# Patient Record
Sex: MALE | Race: WHITE | NOT HISPANIC OR LATINO
[De-identification: names, ages, dates, MRNs, and addresses within clinical notes are randomized per-mention and may not be internally consistent; named-entity substitution may affect disease eponyms.]

---

## 2019-12-16 ENCOUNTER — APPOINTMENT (OUTPATIENT)
Dept: POPULATION HEALTH | Facility: CLINIC | Age: 44
End: 2019-12-16
Payer: MEDICARE

## 2019-12-16 VITALS
WEIGHT: 244 LBS | DIASTOLIC BLOOD PRESSURE: 80 MMHG | BODY MASS INDEX: 36.14 KG/M2 | SYSTOLIC BLOOD PRESSURE: 110 MMHG | HEIGHT: 69 IN | HEART RATE: 103 BPM | TEMPERATURE: 98.7 F | OXYGEN SATURATION: 97 % | RESPIRATION RATE: 16 BRPM

## 2019-12-16 DIAGNOSIS — T78.40XA ALLERGY, UNSPECIFIED, INITIAL ENCOUNTER: ICD-10-CM

## 2019-12-16 DIAGNOSIS — R42 DIZZINESS AND GIDDINESS: ICD-10-CM

## 2019-12-16 DIAGNOSIS — R06.02 SHORTNESS OF BREATH: ICD-10-CM

## 2019-12-16 DIAGNOSIS — Z80.9 FAMILY HISTORY OF MALIGNANT NEOPLASM, UNSPECIFIED: ICD-10-CM

## 2019-12-16 PROCEDURE — 99203 OFFICE O/P NEW LOW 30 MIN: CPT

## 2019-12-16 RX ORDER — OMEPRAZOLE 40 MG/1
40 CAPSULE, DELAYED RELEASE ORAL
Refills: 0 | Status: ACTIVE | COMMUNITY
Start: 2019-12-16

## 2019-12-16 NOTE — HISTORY OF PRESENT ILLNESS
[FreeTextEntry1] : s. patient developed a severe reaction the friday before this past friday (a little over a week ago). his symptoms started at work. \par patient describes that he was returning from lung, he had food that he has eaten before and to which never developed any reaction. 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 the metal chain was covered in some type of oily residue, which he never had noticed before. he had the need to scratch his face and right after that he developed acute swelling of both eyes, swelling behind his ears and difficulty breathing. he went to his car to use some wipes to clean his face. his symptoms worsened and by the time he got home, within some 20 min. he had to call the ambulance and was taken to the er where he was given an epipen and kept in emergency care.\par the swelling subsided some 4 days after but has left symptoms of ringing in his ears, dizziness, walking like dragging his feet, and chest tightness. overall he is getting better but his symptoms have not fully improved. he had some wheezing.\par he was treated with prednisone but developed some ill feeling so has not taken this for the past couple of days. he was given some type of allergic medication by his ent but cannot recall the name, is not taking it. he generally uses benadryl for his allergies as the other antihistamines don't help. \par he has not been back to work.\par patient is seeing an ent and an allergist, his pcp, is to get a mri of his head. \par patient works as an .  \par patient has been working at this job for a couple of months, as a part time. he is on disability due to an accident in his left shoulder a couple of years ago, elbow surgery and both hands surgery. 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 he has been an  all his working life but never encountered this type of chain. \par he is currently on disability due to the accident. \par patient has a history of allergies to dander and dust, but never had such a svere allergic reaction.

## 2019-12-16 NOTE — ASSESSMENT
[FreeTextEntry1] : patient brings a picture of the acute reaction, there is a severe edema of both eyelids totally occluding the eye opening. \par a. acute allergic reaction at work. persistent dizziness and some generalized symptoms.\par p. pt is under care of an ent. recommended to continue ent care. patient to get a mri as recommended. i recommended to totally avoid exposure to the chain that he believed caused his reaction. patient to try to get as much info of this chain as possible, will research what possible chemicals are used to cover such metallic chains in an effort to identify the origin of his reaction. i recommended to use an antihistamine medication such as zyrtec or similar on an ongoing basis for a few days to a week, see if this helps. will review medical records from other doctors as well. patient brings a sample of dust and the chain that he reports as the most probable cause of his allergy, will try to find out if any testing can be done. will see him in a couple of weeks.

## 2019-12-16 NOTE — PAST MEDICAL HISTORY
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 last worked regularly some 2 years ago when he sustain an accident on the job injuring his hands, shoulders and elbows.

## 2019-12-16 NOTE — PHYSICAL EXAM
[General Appearance - In No Acute Distress] : in no acute distress [General Appearance - Alert] : alert [General Appearance - Well Nourished] : well nourished [General Appearance - Well Developed] : well developed [Sclera] : the sclera and conjunctiva were normal [PERRL With Normal Accommodation] : pupils were equal in size, round, and reactive to light [Extraocular Movements] : extraocular movements were intact [Neck Appearance] : the appearance of the neck was normal [Outer Ear] : the ears and nose were normal in appearance [Neck Cervical Mass (___cm)] : no neck mass was observed [Jugular Venous Distention Increased] : there was no jugular-venous distention [Thyroid Diffuse Enlargement] : the thyroid was not enlarged [Respiration, Rhythm And Depth] : normal respiratory rhythm and effort [Exaggerated Use Of Accessory Muscles For Inspiration] : no accessory muscle use [] : no respiratory distress [Chest Palpation] : palpation of the chest revealed no abnormalities [Auscultation Breath Sounds / Voice Sounds] : lungs were clear to auscultation bilaterally [Apical Impulse] : the apical impulse was normal [Heart Rate And Rhythm] : heart rate was normal and rhythm regular [Heart Sounds Gallop] : no gallops [Heart Sounds] : normal S1 and S2 [Murmurs] : no murmurs [Cervical Lymph Nodes Enlarged Posterior Bilaterally] : posterior cervical [Cervical Lymph Nodes Enlarged Anterior Bilaterally] : anterior cervical [Edema] : there was no peripheral edema [Supraclavicular Lymph Nodes Enlarged Bilaterally] : supraclavicular [Axillary Lymph Nodes Enlarged Bilaterally] : axillary [Sensation] : the sensory exam was normal to light touch and pinprick [Cranial Nerves] : cranial nerves 2-12 were intact [Deep Tendon Reflexes (DTR)] : deep tendon reflexes were 2+ and symmetric [Motor Exam] : the motor exam was normal [FreeTextEntry1] : romberg was positive.

## 2020-01-27 ENCOUNTER — APPOINTMENT (OUTPATIENT)
Dept: POPULATION HEALTH | Facility: CLINIC | Age: 45
End: 2020-01-27

## 2020-02-18 ENCOUNTER — APPOINTMENT (OUTPATIENT)
Dept: POPULATION HEALTH | Facility: CLINIC | Age: 45
End: 2020-02-18

## 2020-02-25 ENCOUNTER — APPOINTMENT (OUTPATIENT)
Dept: POPULATION HEALTH | Facility: CLINIC | Age: 45
End: 2020-02-25

## 2022-05-05 ENCOUNTER — RESULT CHARGE (OUTPATIENT)
Age: 47
End: 2022-05-05

## 2022-05-05 ENCOUNTER — APPOINTMENT (OUTPATIENT)
Dept: ORTHOPEDIC SURGERY | Facility: CLINIC | Age: 47
End: 2022-05-05
Payer: MEDICARE

## 2022-05-05 VITALS — BODY MASS INDEX: 36.29 KG/M2 | HEIGHT: 69 IN | WEIGHT: 245 LBS

## 2022-05-05 DIAGNOSIS — Z78.9 OTHER SPECIFIED HEALTH STATUS: ICD-10-CM

## 2022-05-05 DIAGNOSIS — M25.512 PAIN IN LEFT SHOULDER: ICD-10-CM

## 2022-05-05 DIAGNOSIS — Z86.39 PERSONAL HISTORY OF OTHER ENDOCRINE, NUTRITIONAL AND METABOLIC DISEASE: ICD-10-CM

## 2022-05-05 PROCEDURE — 99214 OFFICE O/P EST MOD 30 MIN: CPT

## 2022-05-05 PROCEDURE — 72040 X-RAY EXAM NECK SPINE 2-3 VW: CPT

## 2022-05-05 PROCEDURE — 73010 X-RAY EXAM OF SHOULDER BLADE: CPT | Mod: LT

## 2022-05-06 NOTE — IMAGING
[Left] : left shoulder [There are no fractures, subluxations or dislocations. No significant abnormalities are seen] : There are no fractures, subluxations or dislocations. No significant abnormalities are seen [Straightening consistent with spasm] : Straightening consistent with spasm [de-identified] : The patient is a well appearing 46 year old M of their stated age.\par Neck is supple & nontender to palpation. Negative Spurling's test.\par \par Effected Shoulder:  \par Inspection:\par Scapula Winging: Negative\par Deformity: None\par Erythema: None\par Ecchymosis: None\par Abrasions: None\par Effusion: None\par \par Range of Motion:\par Active Forward Flexion: 95 degrees \par Active Abduction: 95 degrees \par Passive Forward Flexion: 180 degrees \par Passive Abduction: 180 degrees \par ER @ 90 degrees: 90 degrees\par IR @ 90 degrees: 45 degrees\par ER @ 0 degrees: 50 degrees\par \par Motor Exam:\par Forward Flexion: 5 out of 5\par Flexion Plane of Scapula: 5 out of 5\par Abduction: 5 out of 5\par Internal Rotation: 5 out of 5\par External Rotation: 5 out of 5\par Distal Motor Strength: 5 out of 5\par Stability Testing:\par Anterior: 1+\par Posterior: 1+\par Sulcus N: 1+\par Sulcus ER: 1+\par \par Provocative Tests:\par Drop Arm: Negative\par Impingement: Negative\par [place]: Negative\par X-Arm Adduction: Negative\par Belly Press: Negative\par Bear Hug: Negative\par Lift Off: Negative\par Apprehension: Negative\par Relocation: Negative\par Posterior Load & Shift: Negative\par Palpation:\par AC Joint: Nontender\par Clavicle: Nontender\par SC Joint: Nontender\par Bicepital Groove: Nontender\par Coracoid Process: Nontender\par Pectoralis Minor Tendon: Nontender\par Pectoralis Major Tendon: Nontender & palpably intact\par Latissimus Dorsi: Nontender \par Proximal Humerus: Nontender\par Scapula Body: Nontender\par Medial Scapula Boarder: tender\par Scapula Spine: Nontender\par \par Neurologic Exam: Sensation to Light Touch:\par Axillary: Grossly intact\par Ulnar: Grossly intact\par Radial: Grossly intact\par Median: Grossly intact\par Other:  N/A\par Circulatory/Pulses:\par Ulnar: 2+\par Radial: 2+\par Other Pertinent Findings: None\par \par Contralateral Shoulder\par Range of Motion:\par Active Forward Flexion: 180 degrees \par Active Abduction: 180 degrees \par Passive Forward Flexion: 180 degrees \par Passive Abduction: 180 degrees \par ER @ 90 degrees: 90 degrees\par IR @ 90 degrees: 45 degrees\par ER @ 0 degrees: 50 degrees\par Motor Exam:\par Forward Flexion: 5 out of 5\par Flexion Plane of Scapula: 5 out of 5\par Abduction: 5 out of 5\par Internal Rotation: 5 out of 5\par External Rotation: 5 out of 5\par Distal Motor Strength: 5 out of 5\par Stability Testing:\par Anterior: 1+\par Posterior: 1+\par Sulcus N: 1+\par Sulcus ER: 1+\par Other Pertinent Findings: None\par \par \par Assessment: The patient is a 46 year old M with left shoulder pain and radiographic and physical exam findings consistent with suspected cervical disc herniation\par   \par The patient’s condition is acute\par Documents/Results Reviewed Today: Xray left shoulder scapular, Xray cervical spine\par Tests/Studies Independently Interpreted Today: Xray left shoulder scapula: normal -- Xray cervical spine: straightening of lordotic curve, spurring of C5 C6 C7 off vertebral body, \par Pertinent findings include: Cervical spine: pain with axial loading, pain at extremes of flexion and extension, +spurlings left, trapezium and paracervical spasms -- Left shoulder: 95/95, tender midscapular border\par Confounding medical conditions/concerns: none\par \par Plan: Patient will obtain MRI of their cervical spine. \par Tests Ordered: MRI cervical spine to eval for disc herniation\par Prescription Medications Ordered: MDP\par Braces/DME Ordered: none\par Activity/Work/Sports Status: part time work\par Additional Instructions: none\par Follow-Up: after MRI\par \par The patient's current medication management of their orthopedic diagnosis was reviewed today:\par (1) We discussed a comprehensive treatment plan that included possible pharmaceutical management involving the use of prescription strength medications including but not limited to options such as oral Naprosyn 500mg BID, once daily Meloxicam 15 mg, or 500-650 mg Tylenol versus over the counter oral medications and topical prescription NSAID Pennsaid vs over the counter Voltaren gel.\par (2) There is a moderate risk of morbidity with further treatment, especially from use of prescription strength medications and possible side effects of these medications which include upset stomach with oral medications, skin reactions to topical medications and cardiac/renal issues with long term use.\par (3) I recommended that the patient follow-up with their medical physician to discuss any significant specific potential issues with long term medication use such as interactions with current medications or with exacerbation of underlying medical comorbidities.\par (4) The benefits and risks associated with use of injectable, oral or topical, prescription and over the counter anti-inflammatory medications were discussed with the patient. The patient voiced understanding of the risks including but not limited to bleeding, stroke, kidney dysfunction, heart disease, and were referred to the black box warning label for further information.\par \par I, Gaetano Fisher, attest that this documentation has been prepared under the direction and in the presence of Provider Dr. Tobin\par \par The documentation recorded by the scribe accurately reflects the service I personally performed and the decisions made by me.\par The patient was seen by me under the direct supervision of Dr. Micah Tobin\par \par \par  [FreeTextEntry1] : normal [FreeTextEntry5] : normal

## 2022-05-06 NOTE — HISTORY OF PRESENT ILLNESS
[de-identified] : The patient is a 46 year old right hand dominant male  who presents today complaining of left shoulder.  \par Date of Injury/Onset: 5/1/22\par Pain:    At Rest: 6/10 \par With Activity:  8/10 \par Mechanism of injury: Patient was taking out the garbage when he went to support it and he felt a pop in his scapula\par This is [not] a Work Related Injury being treated under Worker's Compensation.\par This is [not] an athletic injury occurring associated with an interscholastic or organized sports team.\par Quality of symptoms: Constant ache, sharp pain w/ movement\par Improves with: rest\par Worse with: lying on his back, raising the arm. \par Prior treatment: ER on Monday; received xr and oxy and methocarbamol\par Prior Imaging: XR; didn't bring a CD\par Reports Available For Review Today: None\par Out of work/sport: [No], since [date of injury]\par School/Sport/Position/Occupation: part-time work\par Additional Information: [None]\par \par

## 2022-05-06 NOTE — PHYSICAL EXAM
[] : diminished ROM in all planes [Flexion] : flexion [Extension] : extension [Rotation to left] : rotation to left [Rotation to right] : rotation to right [FreeTextEntry8] : pain with axial loading

## 2022-05-13 ENCOUNTER — RESULT REVIEW (OUTPATIENT)
Age: 47
End: 2022-05-13

## 2022-05-16 ENCOUNTER — APPOINTMENT (OUTPATIENT)
Dept: ORTHOPEDIC SURGERY | Facility: CLINIC | Age: 47
End: 2022-05-16
Payer: MEDICARE

## 2022-05-16 VITALS — BODY MASS INDEX: 36.29 KG/M2 | HEIGHT: 69 IN | WEIGHT: 245 LBS

## 2022-05-16 PROCEDURE — 99215 OFFICE O/P EST HI 40 MIN: CPT

## 2022-05-16 NOTE — IMAGING
[de-identified] : The patient is a well appearing 46 year old M of their stated age.\par Neck is supple & nontender to palpation. Negative Spurling's test.\par \par Effected Shoulder:  \par Inspection:\par Scapula Winging: Negative\par Deformity: None\par Erythema: None\par Ecchymosis: None\par Abrasions: None\par Effusion: None\par \par Range of Motion:\par Active Forward Flexion: 95 degrees \par Active Abduction: 95 degrees \par Passive Forward Flexion: 180 degrees \par Passive Abduction: 180 degrees \par ER @ 90 degrees: 90 degrees\par IR @ 90 degrees: 45 degrees\par ER @ 0 degrees: 50 degrees\par \par Motor Exam:\par Forward Flexion: 5 out of 5\par Flexion Plane of Scapula: 5 out of 5\par Abduction: 5 out of 5\par Internal Rotation: 5 out of 5\par External Rotation: 5 out of 5\par Distal Motor Strength: 5 out of 5\par Stability Testing:\par Anterior: 1+\par Posterior: 1+\par Sulcus N: 1+\par Sulcus ER: 1+\par \par Provocative Tests:\par Drop Arm: Negative\par Impingement: Negative\par [place]: Negative\par X-Arm Adduction: Negative\par Belly Press: Negative\par Bear Hug: Negative\par Lift Off: Negative\par Apprehension: Negative\par Relocation: Negative\par Posterior Load & Shift: Negative\par Palpation:\par AC Joint: Nontender\par Clavicle: Nontender\par SC Joint: Nontender\par Bicepital Groove: Nontender\par Coracoid Process: Nontender\par Pectoralis Minor Tendon: Nontender\par Pectoralis Major Tendon: Nontender & palpably intact\par Latissimus Dorsi: Nontender \par Proximal Humerus: Nontender\par Scapula Body: Nontender\par Medial Scapula Boarder: tender\par Scapula Spine: Nontender\par \par Neurologic Exam: Sensation to Light Touch:\par Axillary: Grossly intact\par Ulnar: Grossly intact\par Radial: Grossly intact\par Median: Grossly intact\par Other:  N/A\par Circulatory/Pulses:\par Ulnar: 2+\par Radial: 2+\par Other Pertinent Findings: None\par \par Contralateral Shoulder\par Range of Motion:\par Active Forward Flexion: 180 degrees \par Active Abduction: 180 degrees \par Passive Forward Flexion: 180 degrees \par Passive Abduction: 180 degrees \par ER @ 90 degrees: 90 degrees\par IR @ 90 degrees: 45 degrees\par ER @ 0 degrees: 50 degrees\par Motor Exam:\par Forward Flexion: 5 out of 5\par Flexion Plane of Scapula: 5 out of 5\par Abduction: 5 out of 5\par Internal Rotation: 5 out of 5\par External Rotation: 5 out of 5\par Distal Motor Strength: 5 out of 5\par Stability Testing:\par Anterior: 1+\par Posterior: 1+\par Sulcus N: 1+\par Sulcus ER: 1+\par Other Pertinent Findings: None\par \par \par Assessment: The patient is a 46 year old M with left shoulder pain and radiographic and physical exam findings consistent with cervical bone lesion\par   \par The patient’s condition is acute\par Documents/Results Reviewed Today: MRI cervical spine\par Tests/Studies Independently Interpreted Today: MRI cervical spine reveals marrow replacing lesions at T1 and T2 as well as anterior superior endplate of C3 with posterior expansion of T1 vertebral body, disc herniation at C3-4, large left lobe of thyroid nodule\par Pertinent findings include: Cervical spine: pain with axial loading, pain at extremes of flexion and extension, +spurlings left, trapezium and paracervical spasms -- Left shoulder: 95/95, tender midscapular border\par Confounding medical conditions/concerns: none\par \par Plan: Order contrast MRI of cervical spine, ordered with and w/out contract thoracic spine MRIs, ordered CT-Scan thoracic. \par Tests Ordered: Contrast Cervical MRI, With and w/out Contrast Thoracic MRI, CT-Scan Thoracic spine to eval for metastatic vs. primary bone lesion\par Prescription Medications Ordered: none\par Braces/DME Ordered: none\par Activity/Work/Sports Status: part time work\par Additional Instructions: none\par Follow-Up: after MRIs and CT-Scan\par \par The patient's current medication management of their orthopedic diagnosis was reviewed today:\par (1) We discussed a comprehensive treatment plan that included possible pharmaceutical management involving the use of prescription strength medications including but not limited to options such as oral Naprosyn 500mg BID, once daily Meloxicam 15 mg, or 500-650 mg Tylenol versus over the counter oral medications and topical prescription NSAID Pennsaid vs over the counter Voltaren gel.\par (2) There is a moderate risk of morbidity with further treatment, especially from use of prescription strength medications and possible side effects of these medications which include upset stomach with oral medications, skin reactions to topical medications and cardiac/renal issues with long term use.\par (3) I recommended that the patient follow-up with their medical physician to discuss any significant specific potential issues with long term medication use such as interactions with current medications or with exacerbation of underlying medical comorbidities.\par (4) The benefits and risks associated with use of injectable, oral or topical, prescription and over the counter anti-inflammatory medications were discussed with the patient. The patient voiced understanding of the risks including but not limited to bleeding, stroke, kidney dysfunction, heart disease, and were referred to the black box warning label for further information.\par \par I, Gaetano Fisher, attest that this documentation has been prepared under the direction and in the presence of Provider Dr. Tobin\par \par The documentation recorded by the scribe accurately reflects the service I personally performed and the decisions made by me.\par \par

## 2022-05-16 NOTE — DATA REVIEWED
[MRI] : MRI [Cervical Spine] : cervical spine [Report was reviewed and noted in the chart] : The report was reviewed and noted in the chart [I independently reviewed and interpreted images and report] : I independently reviewed and interpreted images and report [I reviewed the films/CD and additionally noted] : I reviewed the films/CD and additionally noted [FreeTextEntry1] : marrow replacing lesions at T1 and T2 as well as anterior superior endplate of C3 with posterior expansion of T1 vertebral body, disc herniation at C3-4, large left lobe of thyroid nodule

## 2022-05-16 NOTE — HISTORY OF PRESENT ILLNESS
[de-identified] : The patient is a 46 year old right hand dominant male  who presents today complaining of left shoulder.  \par Date of Injury/Onset: 5/1/22\par Pain:    At Rest: 5/10 \par With Activity:  8/10 \par Mechanism of injury: Patient was taking out the garbage when he went to support it and he felt a pop in his scapula\par This is not a Work Related Injury being treated under Worker's Compensation.\par This is not an athletic injury occurring associated with an interscholastic or organized sports team.\par Quality of symptoms: Constant ache, sharp pain w/ movement\par Improves with: rest\par Worse with: lying on his back, raising the arm. \par Treatment/Imaging/Studies Since Last Visit: MRI\par 	Reports Available For Review Today: MRI report\par Out of work/sport: not working\par School/Sport/Position/Occupation: part-time work\par Additional Information: pt states he thinks he broke a rib while sneezing\par \par

## 2022-05-20 ENCOUNTER — RESULT REVIEW (OUTPATIENT)
Age: 47
End: 2022-05-20

## 2022-05-23 ENCOUNTER — APPOINTMENT (OUTPATIENT)
Dept: ORTHOPEDIC SURGERY | Facility: CLINIC | Age: 47
End: 2022-05-23
Payer: MEDICARE

## 2022-05-23 VITALS — BODY MASS INDEX: 36.29 KG/M2 | HEIGHT: 69 IN | WEIGHT: 245 LBS

## 2022-05-23 DIAGNOSIS — M54.2 CERVICALGIA: ICD-10-CM

## 2022-05-23 PROCEDURE — 99214 OFFICE O/P EST MOD 30 MIN: CPT

## 2022-05-24 NOTE — HISTORY OF PRESENT ILLNESS
[Sudden] : sudden [Dull/Aching] : dull/aching [Sharp] : sharp [Shooting] : shooting [Frequent] : frequent [Leisure] : leisure [Sleep] : sleep [Nothing helps with pain getting better] : Nothing helps with pain getting better [Sitting] : sitting [Not working due to injury] : Work status: not working due to injury [de-identified] : Patient is a 47 y/o male who presents for evaluation of a chief complaint of neck pain. Reports worsening stabbing pain at the base of the neck over several months duration. Notes some burning and numbness and tingling into the left arm and forearm. 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 Symptoms aggravated with neck extension and activity. Denies recent constitutional symptoms. A known h/o thyroid cyst. [] : no [FreeTextEntry1] : T-spine / C-spine  [FreeTextEntry7] : left arm  [de-identified] : Stretching  [de-identified] : T-spine Epidurals [de-identified] : Dr. Bermeo  [de-identified] : MRI @

## 2022-05-24 NOTE — DISCUSSION/SUMMARY
[de-identified] : Discussed radiographic findings with patient as they pertain to his symptoms. Discussed differential diagnosis of metastatic disease with infection a distant second. Discussed workup to include blood work( cbc with diff, ESR, c reactive protein, thyroid function tests, psa, spep/upep, comp chem) Prescriptions given for CT chest, abdomen and pelvis. Referred to interventional radiology for CT guided biopsy to obtain tissue diagnosis.

## 2022-05-24 NOTE — DATA REVIEWED
[CT Scan] : CT scan [MRI] : MRI [Cervical Spine] : cervical spine [Thoracic Spine] : thoracic spine [Report was reviewed and noted in the chart] : The report was reviewed and noted in the chart [I independently reviewed and interpreted images and report] : I independently reviewed and interpreted images and report [I reviewed the films/CD and additionally noted] : I reviewed the films/CD and additionally noted [FreeTextEntry1] : I stop paperwork reviewed

## 2022-05-24 NOTE — PHYSICAL EXAM
[Normal Coordination] : normal coordination [Normal DTR UE/LE] : normal DTR UE/LE  [Normal Sensation] : normal sensation [Normal Mood and Affect] : normal mood and affect [Orientated] : orientated [Able to Communicate] : able to communicate [Normal Skin] : normal skin [No Rash] : no rash [No Ulcers] : no ulcers [No Lesions] : no lesions [No obvious lymphadenopathy in areas examined] : no obvious lymphadenopathy in areas examined [Well Developed] : well developed [Well Nourished] : well nourished [Peripheral vascular exam is grossly normal] : peripheral vascular exam is grossly normal [No Respiratory Distress] : no respiratory distress [Rotation to left] : rotation to left [Extension] : extension [4___] : left finger abductors 4[unfilled]/5 [] : positive Spurling [de-identified] : left finger adductors 4/5

## 2022-06-04 ENCOUNTER — RESULT REVIEW (OUTPATIENT)
Age: 47
End: 2022-06-04

## 2022-06-24 ENCOUNTER — APPOINTMENT (OUTPATIENT)
Dept: ORTHOPEDIC SURGERY | Facility: CLINIC | Age: 47
End: 2022-06-24

## 2022-06-24 VITALS — HEIGHT: 69 IN | WEIGHT: 245 LBS | BODY MASS INDEX: 36.29 KG/M2

## 2022-06-24 DIAGNOSIS — R07.9 CHEST PAIN, UNSPECIFIED: ICD-10-CM

## 2022-06-24 PROCEDURE — 72070 X-RAY EXAM THORAC SPINE 2VWS: CPT

## 2022-06-24 PROCEDURE — 99214 OFFICE O/P EST MOD 30 MIN: CPT

## 2022-06-24 PROCEDURE — 73000 X-RAY EXAM OF COLLAR BONE: CPT | Mod: LT

## 2022-06-24 PROCEDURE — 72040 X-RAY EXAM NECK SPINE 2-3 VW: CPT

## 2022-06-26 NOTE — DATA REVIEWED
[Cervical Spine] : cervical spine [Thoracic Spine] : thoracic spine [I independently reviewed and interpreted images and report] : I independently reviewed and interpreted images and report [I reviewed the films/CD and additionally noted] : I reviewed the films/CD and additionally noted [FreeTextEntry2] : Chest xray taken in office today demonstrates no evidence of rib fracture [FreeTextEntry1] : I stop paperwork reviewed\par Hematology consult notes reviewed\par Biopsy report reviewed

## 2022-06-26 NOTE — DISCUSSION/SUMMARY
[Medication Risks Reviewed] : Medication risks reviewed [de-identified] : Discussed radiographic findings with patient as they pertain to his symptoms. Discussed diagnosis of multiple myeloma with thoracic vertebra involvement and left C8 nerve impingement. Patient will continue follow up with oncologist. Scheduled for directed radiation therapy next week.Repeated x-ray for thoracic spine reveal pathologic lesions in T1-2 with no worsening collapse of the vertebrae. Rib pain likely bone contusion. Will consider surgical intervention if chemo/RT do not improve symptoms.Will follow up in 4 weeks. \par \par \par Prior to appointment and during encounter with patient extensive medical records were reviewed including but not limited to, hospital records, outpatient records, imaging results, and lab data. During this appointment the patient was examined, diagnoses were discussed and explained in a face to face manner. In addition extensive time was spent reviewing aforementioned diagnostic studies. Counseling including abnormal image results, differential diagnoses, treatment options, risk and benefits, lifestyle changes, current condition, and current medications was performed. Patient's comments, questions, and concerns were addressed and patient verbalized understanding. Based on this patient's presentation at our office, which is an orthopedic spine surgeon's office, this patient inherently / intrinsically has a risk, however minute, of developing issues such as Cauda equina syndrome, bowel and bladder changes, or progression of motor or neurological deficits such as paralysis which may be permanent.\par \par The documentation recorded by the scribe, in my presence, accurately reflects the service that I personally performed and the decisions made by me with my edits as appropriate.\par

## 2022-06-26 NOTE — HISTORY OF PRESENT ILLNESS
[9] : 9 [8] : 8 [de-identified] : Patient is a 46 y/o male who presents for evaluation of a chief complaint of neck pain. Reports worsening stabbing pain at the base of the neck over several months duration. Notes some burning and numbness and tingling into the left arm and forearm. 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 Symptoms aggravated with neck extension and activity. A known h/o thyroid cyst. Patient currently undergoing radiation therapy for multiple myeloma. Patient is taking Lyrica and oxycodone. Patient states moderate relief from medications.  [FreeTextEntry1] : T spine

## 2022-06-26 NOTE — PHYSICAL EXAM
[Normal Coordination] : normal coordination [Normal DTR UE/LE] : normal DTR UE/LE  [Normal Sensation] : normal sensation [Normal Mood and Affect] : normal mood and affect [Orientated] : orientated [Able to Communicate] : able to communicate [Normal Skin] : normal skin [No Rash] : no rash [No Ulcers] : no ulcers [No Lesions] : no lesions [No obvious lymphadenopathy in areas examined] : no obvious lymphadenopathy in areas examined [Well Developed] : well developed [Well Nourished] : well nourished [Peripheral vascular exam is grossly normal] : peripheral vascular exam is grossly normal [No Respiratory Distress] : no respiratory distress [Rotation to left] : rotation to left [Extension] : extension [4___] : left finger abductors 4[unfilled]/5 [] : positive Spurling [de-identified] : left finger adductors 4/5 [FreeTextEntry1] : Pathologic lesions in T1-2 with no worsening collapse of the vertebrae.  [FreeTextEntry5] : No fractures

## 2022-08-01 ENCOUNTER — APPOINTMENT (OUTPATIENT)
Dept: ORTHOPEDIC SURGERY | Facility: CLINIC | Age: 47
End: 2022-08-01

## 2022-08-01 VITALS — HEIGHT: 69 IN | BODY MASS INDEX: 37.03 KG/M2 | WEIGHT: 250 LBS

## 2022-08-01 PROCEDURE — 99214 OFFICE O/P EST MOD 30 MIN: CPT

## 2022-08-14 NOTE — DATA REVIEWED
[CT Scan] : CT scan [MRI] : MRI [Report was reviewed and noted in the chart] : The report was reviewed and noted in the chart [Cervical Spine] : cervical spine [I independently reviewed and interpreted images and report] : I independently reviewed and interpreted images and report [I reviewed the films/CD and additionally noted] : I reviewed the films/CD and additionally noted [FreeTextEntry1] : I stop paperwork reviewed\par Oncology progress notes reviewed

## 2022-08-14 NOTE — PHYSICAL EXAM
[Normal Coordination] : normal coordination [Normal DTR UE/LE] : normal DTR UE/LE  [Normal Sensation] : normal sensation [Normal Mood and Affect] : normal mood and affect [Orientated] : orientated [Able to Communicate] : able to communicate [Normal Skin] : normal skin [No Rash] : no rash [No Ulcers] : no ulcers [No Lesions] : no lesions [No obvious lymphadenopathy in areas examined] : no obvious lymphadenopathy in areas examined [Well Developed] : well developed [Well Nourished] : well nourished [Peripheral vascular exam is grossly normal] : peripheral vascular exam is grossly normal [No Respiratory Distress] : no respiratory distress [Rotation to left] : rotation to left [Extension] : extension [4___] : left finger abductors 4[unfilled]/5 [] : positive Spurling [FreeTextEntry8] : Left sided trapezial tenderness worse.  [de-identified] : left finger adductors 4/5 [FreeTextEntry1] : Pathologic lesions in T1-2 with no worsening collapse of the vertebrae.  [FreeTextEntry5] : No fractures

## 2022-08-14 NOTE — HISTORY OF PRESENT ILLNESS
[6] : 6 [10] : 10 [Disabled] : Work status: disabled [de-identified] : Patient is a 48 y/o male who presents for evaluation of a chief complaint of neck pain. Reports worsening stabbing pain at the base of the neck over several months duration. Notes some burning and numbness and tingling into the left arm and forearm. 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 Symptoms aggravated with neck extension and activity. A known h/o thyroid cyst. Patient currently undergoing radiation therapy for multiple myeloma. Patient is taking Lyrica and oxycodone. Patient states moderate relief from medications.  Reports improvement in dexterity. Improvement of symptoms since previous visit. \par C/o pelvis pain, with associated intermittent LT thigh pain.  [de-identified] : pain mgmt

## 2022-08-14 NOTE — DISCUSSION/SUMMARY
[Medication Risks Reviewed] : Medication risks reviewed [de-identified] : Discussed radiographic findings with patient as they pertain to his symptoms. Discussed diagnosis of multiple myeloma with thoracic vertebra involvement and left C8 nerve impingement. Patient will continue follow up with oncologist. Discussed looking out for onset of new neck symptoms. Advised patient to look out for changes in BUE strength. Repeated x-ray for thoracic spine reveal pathologic lesions in T1-2 with no worsening collapse of the vertebrae. Rib pain likely bone contusion. Will consider surgical intervention if chemo/RT do not improve symptoms.Will follow up in 6.\par \par \par Prior to appointment and during encounter with patient extensive medical records were reviewed including but not limited to, hospital records, outpatient records, imaging results, and lab data. During this appointment the patient was examined, diagnoses were discussed and explained in a face to face manner. In addition extensive time was spent reviewing aforementioned diagnostic studies. Counseling including abnormal image results, differential diagnoses, treatment options, risk and benefits, lifestyle changes, current condition, and current medications was performed. Patient's comments, questions, and concerns were addressed and patient verbalized understanding. Based on this patient's presentation at our office, which is an orthopedic spine surgeon's office, this patient inherently / intrinsically has a risk, however minute, of developing issues such as Cauda equina syndrome, bowel and bladder changes, or progression of motor or neurological deficits such as paralysis which may be permanent.\par \par The documentation recorded by the scribe, in my presence, accurately reflects the service that I personally performed and the decisions made by me with my edits as appropriate.\par

## 2022-09-19 ENCOUNTER — APPOINTMENT (OUTPATIENT)
Dept: ORTHOPEDIC SURGERY | Facility: CLINIC | Age: 47
End: 2022-09-19

## 2022-10-05 ENCOUNTER — APPOINTMENT (OUTPATIENT)
Dept: ORTHOPEDIC SURGERY | Facility: CLINIC | Age: 47
End: 2022-10-05

## 2022-10-31 ENCOUNTER — APPOINTMENT (OUTPATIENT)
Dept: ORTHOPEDIC SURGERY | Facility: CLINIC | Age: 47
End: 2022-10-31

## 2022-10-31 VITALS — BODY MASS INDEX: 37.03 KG/M2 | HEIGHT: 69 IN | WEIGHT: 250 LBS

## 2022-10-31 DIAGNOSIS — C80.1 MALIGNANT (PRIMARY) NEOPLASM, UNSPECIFIED: ICD-10-CM

## 2022-10-31 DIAGNOSIS — Z78.9 OTHER SPECIFIED HEALTH STATUS: ICD-10-CM

## 2022-10-31 PROCEDURE — 72040 X-RAY EXAM NECK SPINE 2-3 VW: CPT

## 2022-10-31 PROCEDURE — 99214 OFFICE O/P EST MOD 30 MIN: CPT

## 2022-10-31 NOTE — DISCUSSION/SUMMARY
[Medication Risks Reviewed] : Medication risks reviewed [de-identified] : Discussed diagnosis of multiple myeloma with thoracic vertebra involvement and left C8 nerve impingement. Patient will continue follow up with oncologist. Discussed looking out for onset of new neck symptoms. Advised patient to look out for changes in BUE strength. Repeated x-ray for thoracic spine reveal pathologic lesions in T1-2 with no worsening collapse of the vertebrae. I am requesting a cervical spine MRI with and without contrast, h/o destructive lesion C7-T1 s/p chemo, persistent left upper extremity radiculopathy, will eval for nerve impingement C8. Will consider surgical intervention if chemo/RT do not improve symptoms. Will follow up after MRI. \par \par Prior to appointment and during encounter with patient extensive medical records were reviewed including but not limited to, hospital records, outpatient records, imaging results, and lab data. During this appointment the patient was examined, diagnoses were discussed and explained in a face to face manner. In addition extensive time was spent reviewing aforementioned diagnostic studies. Counseling including abnormal image results, differential diagnoses, treatment options, risk and benefits, lifestyle changes, current condition, and current medications was performed. Patient's comments, questions, and concerns were addressed and patient verbalized understanding. Based on this patient's presentation at our office, which is an orthopedic spine surgeon's office, this patient inherently / intrinsically has a risk, however minute, of developing issues such as Cauda equina syndrome, bowel and bladder changes, or progression of motor or neurological deficits such as paralysis which may be permanent.\par \par MAHNAZ QUEZADA Acting as a Scribe for Dr. Ghotra\alicia BROUSSARD, Mahnaz Quezada, attest that this documentation has been prepared under the direction and in the presence of Provider Darrell Ghotra MD.

## 2022-10-31 NOTE — DATA REVIEWED
[MRI] : MRI [Report was reviewed and noted in the chart] : The report was reviewed and noted in the chart [Cervical Spine] : cervical spine [I independently reviewed and interpreted images and report] : I independently reviewed and interpreted images and report [I reviewed the films/CD and additionally noted] : I reviewed the films/CD and additionally noted [FreeTextEntry2] : In office xrays taken today demonstrate maintained vertebral body heights and alignment C7 and T1 [FreeTextEntry1] : I stop paperwork reviewed

## 2022-10-31 NOTE — PHYSICAL EXAM
[Normal Coordination] : normal coordination [Normal DTR UE/LE] : normal DTR UE/LE  [Normal Sensation] : normal sensation [Normal Mood and Affect] : normal mood and affect [Orientated] : orientated [Able to Communicate] : able to communicate [Normal Skin] : normal skin [No Rash] : no rash [No Ulcers] : no ulcers [No Lesions] : no lesions [No obvious lymphadenopathy in areas examined] : no obvious lymphadenopathy in areas examined [Well Developed] : well developed [Well Nourished] : well nourished [Peripheral vascular exam is grossly normal] : peripheral vascular exam is grossly normal [No Respiratory Distress] : no respiratory distress [Rotation to left] : rotation to left [Extension] : extension [4___] : left finger abductors 4[unfilled]/5 [] : positive Spurling [FreeTextEntry8] : Left sided trapezial tenderness worse.  [de-identified] : left finger adductors 4/5 [FreeTextEntry1] : Pathologic lesions in T1-2 with no worsening collapse of the vertebrae.  [FreeTextEntry5] : No fractures

## 2022-10-31 NOTE — HISTORY OF PRESENT ILLNESS
[3] : 3 [Radiating] : radiating [] : yes [Not working due to injury] : Work status: not working due to injury [de-identified] : 10/31/2022 - 46 y/o male presenting for a follow up eval of cervical. Stiffness present in the neck, as well as pain/numbness in the left upper extremity. Since his last visit, no recent imaging due to current cancer treatment. Pain present in the lt forearm, denies pain in the upper arm. Paresthesias present in the b/l hands, left worse than right. Left upper extremity strength improved as well as ROM in the neck. States oncology specialists are pleased with his current progress from radiation therapy. \par \par 08/01/2022 - Patient is a 46 y/o male who presents for evaluation of a chief complaint of neck pain. Reports worsening stabbing pain at the base of the neck over several months duration. Notes some burning and numbness and tingling into the left arm and forearm. 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 Symptoms aggravated with neck extension and activity. A known h/o thyroid cyst. Patient currently undergoing radiation therapy for multiple myeloma. Patient is taking Lyrica and oxycodone. Patient states moderate relief from medications.  Reports improvement in dexterity. Improvement of symptoms since previous visit. \par C/o pelvis pain, with associated intermittent LT thigh pain. \par \par \par  [FreeTextEntry7] : lt arm/elbow, rt hand [de-identified] : no treatment at this time

## 2022-11-12 ENCOUNTER — APPOINTMENT (OUTPATIENT)
Dept: ORTHOPEDIC SURGERY | Facility: CLINIC | Age: 47
End: 2022-11-12

## 2022-12-13 ENCOUNTER — APPOINTMENT (OUTPATIENT)
Dept: ORTHOPEDIC SURGERY | Facility: CLINIC | Age: 47
End: 2022-12-13

## 2022-12-13 PROCEDURE — 99443: CPT

## 2022-12-15 NOTE — HISTORY OF PRESENT ILLNESS
[de-identified] : 12/13/2022 - 48 y/o male presenting for a tele-visit of cervical. Complains of neck, upper/mid back pain. Radicular pain present in the b/l arms, worse on the left side. Continues to receive stem cell transplant, states he is doing well in regards to his cancer treatment. Using Lyrica 3 x / day, no significant relief. States his symptoms are significant. \par \par 10/31/2022 - 48 y/o male presenting for a follow up eval of cervical. Stiffness present in the neck, as well as pain/numbness in the left upper extremity. Since his last visit, no recent imaging due to current cancer treatment. Pain present in the lt forearm, denies pain in the upper arm. Paresthesias present in the b/l hands, left worse than right. Left upper extremity strength improved as well as ROM in the neck. hospitals oncology specialists are pleased with his current progress from radiation therapy. \par \par 08/01/2022 - Patient is a 48 y/o male who presents for evaluation of a chief complaint of neck pain. Reports worsening stabbing pain at the base of the neck over several months duration. Notes some burning and numbness and tingling into the left arm and forearm. 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 Symptoms aggravated with neck extension and activity. A known h/o thyroid cyst. Patient currently undergoing radiation therapy for multiple myeloma. Patient is taking Lyrica and oxycodone. Patient states moderate relief from medications.  Reports improvement in dexterity. Improvement of symptoms since previous visit. \par C/o pelvis pain, with associated intermittent LT thigh pain. \par \par \par

## 2022-12-15 NOTE — DATA REVIEWED
[MRI] : MRI [Cervical Spine] : cervical spine [Report was reviewed and noted in the chart] : The report was reviewed and noted in the chart [FreeTextEntry1] : I stop paperwork reviewed

## 2022-12-15 NOTE — DISCUSSION/SUMMARY
[Medication Risks Reviewed] : Medication risks reviewed [de-identified] : Discussed diagnosis of multiple myeloma with thoracic vertebra involvement and left C8 nerve impingement. Patient will continue follow up with oncologist. Discussed looking out for onset of new neck symptoms. Advised patient to look out for changes in BUE strength. Advised patient to wear cervical collar in terms of potential relief of his neck pain. Patient will call [place] [facility] and receive images of the report. Will find out timeline from oncology to discuss when it would be safe to move forward with likely longer segment posterior cervicothoracic fusion with decompression.\par \par Todays televisit was initiated bty the patient.Patient was not seen in the past week nor will be seen in the next 24 hours. Visit duration  minutes.. \par \par Prior to appointment and during encounter with patient extensive medical records were reviewed including but not limited to, hospital records, outpatient records, imaging results, and lab data. During this appointment the patient was examined, diagnoses were discussed and explained in a face to face manner. In addition extensive time was spent reviewing aforementioned diagnostic studies. Counseling including abnormal image results, differential diagnoses, treatment options, risk and benefits, lifestyle changes, current condition, and current medications was performed. Patient's comments, questions, and concerns were addressed and patient verbalized understanding. Based on this patient's presentation at our office, which is an orthopedic spine surgeon's office, this patient inherently / intrinsically has a risk, however minute, of developing issues such as Cauda equina syndrome, bowel and bladder changes, or progression of motor or neurological deficits such as paralysis which may be permanent.\par \par MAHNAZ QUEZADA Acting as a Scribe for Dr. Sarah BROUSSARD, Mahnaz Quezada, attest that this documentation has been prepared under the direction and in the presence of Provider Darrell Ghotra MD.

## 2023-01-25 ENCOUNTER — APPOINTMENT (OUTPATIENT)
Dept: ORTHOPEDIC SURGERY | Facility: CLINIC | Age: 48
End: 2023-01-25

## 2023-01-27 ENCOUNTER — APPOINTMENT (OUTPATIENT)
Dept: ORTHOPEDIC SURGERY | Facility: CLINIC | Age: 48
End: 2023-01-27
Payer: MEDICARE

## 2023-01-27 VITALS — WEIGHT: 245 LBS | BODY MASS INDEX: 36.29 KG/M2 | HEIGHT: 69 IN

## 2023-01-27 PROCEDURE — 99214 OFFICE O/P EST MOD 30 MIN: CPT

## 2023-02-06 NOTE — PHYSICAL EXAM
[Normal Coordination] : normal coordination [Normal DTR UE/LE] : normal DTR UE/LE  [Normal Sensation] : normal sensation [Normal Mood and Affect] : normal mood and affect [Orientated] : orientated [Able to Communicate] : able to communicate [Normal Skin] : normal skin [No Rash] : no rash [No Ulcers] : no ulcers [No Lesions] : no lesions [No obvious lymphadenopathy in areas examined] : no obvious lymphadenopathy in areas examined [Well Developed] : well developed [Well Nourished] : well nourished [Peripheral vascular exam is grossly normal] : peripheral vascular exam is grossly normal [No Respiratory Distress] : no respiratory distress [Rotation to left] : rotation to left [Extension] : extension [4___] : left finger abductors 4[unfilled]/5 [] : positive Spurling [FreeTextEntry8] : Left sided trapezial tenderness worse.  [de-identified] : left finger adductors 4/5 [FreeTextEntry1] : Pathologic lesions in T1-2 with no worsening collapse of the vertebrae.  [FreeTextEntry5] : No fractures

## 2023-02-06 NOTE — DISCUSSION/SUMMARY
[Medication Risks Reviewed] : Medication risks reviewed [de-identified] : Discussed diagnosis of multiple myeloma with thoracic vertebra involvement and left C8 nerve impingement. Patient will continue follow up with oncologist as directed. Discussed looking out for onset of new neck symptoms. Patient is indicated for segment posterior cervicothoracic fusion with decompression. Risks, benefits and expected outcomes have been explained to the patient. Patient was understanding and in agreement. Patient will follow up with oncology to discuss when it would be safe to move forward with surgical intervention, the discussed timeline as of right now is 02/2023 if PET Scan returns normal. \par \par Prior to appointment and during encounter with patient extensive medical records were reviewed including but not limited to, hospital records, outpatient records, imaging results, and lab data. During this appointment the patient was examined, diagnoses were discussed and explained in a face to face manner. In addition extensive time was spent reviewing aforementioned diagnostic studies. Counseling including abnormal image results, differential diagnoses, treatment options, risk and benefits, lifestyle changes, current condition, and current medications was performed. Patient's comments, questions, and concerns were addressed and patient verbalized understanding. Based on this patient's presentation at our office, which is an orthopedic spine surgeon's office, this patient inherently / intrinsically has a risk, however minute, of developing issues such as Cauda equina syndrome, bowel and bladder changes, or progression of motor or neurological deficits such as paralysis which may be permanent.\par \par MAHNAZ PATRICIA Acting as a Scribe for Mahnaz Rivas, attest that this documentation has been prepared under the direction and in the presence of Provider Darrell Ghotra MD.

## 2023-02-06 NOTE — HISTORY OF PRESENT ILLNESS
[Neck] : neck [Mid-back] : mid-back [Gradual] : gradual [5] : 5 [Dull/Aching] : dull/aching [Constant] : constant [Nothing helps with pain getting better] : Nothing helps with pain getting better [de-identified] : 01/27/2023 - 48 y/o male presenting for a follow up. Doing well in regards to his cancer treatment, oncology specialists are happy with his progress and are on board for end of 02/2023 as long as PET Scan is normal. Continues to have persistent paresthesia and forearm pain. Strength in the upper extremities is intact with the exception of left abduction in the fingers. Symptoms are functionally incapacitating, ready to move forward with surgery.  \par \par 12/13/2022 - 48 y/o male presenting for a tele-visit of cervical. Complains of neck, upper/mid back pain. Radicular pain present in the b/l arms, worse on the left side. Continues to receive stem cell transplant, states he is doing well in regards to his cancer treatment. Using Lyrica 3 x / day, no significant relief. States his symptoms are significant. \par \par 10/31/2022 - 48 y/o male presenting for a follow up eval of cervical. Stiffness present in the neck, as well as pain/numbness in the left upper extremity. Since his last visit, no recent imaging due to current cancer treatment. Pain present in the lt forearm, denies pain in the upper arm. Paresthesias present in the b/l hands, left worse than right. Left upper extremity strength improved as well as ROM in the neck. States oncology specialists are pleased with his current progress from radiation therapy. \par \par 08/01/2022 - Patient is a 48 y/o male who presents for evaluation of a chief complaint of neck pain. Reports worsening stabbing pain at the base of the neck over several months duration. Notes some burning and numbness and tingling into the left arm and forearm. 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 Symptoms aggravated with neck extension and activity. A known h/o thyroid cyst. Patient currently undergoing radiation therapy for multiple myeloma. Patient is taking Lyrica and oxycodone. Patient states moderate relief from medications.  Reports improvement in dexterity. Improvement of symptoms since previous visit. \par C/o pelvis pain, with associated intermittent LT thigh pain. \par \par \par  [de-identified] : lifting,turning

## 2023-02-06 NOTE — DATA REVIEWED
[CT Scan] : CT scan [Cervical Spine] : cervical spine [Report was reviewed and noted in the chart] : The report was reviewed and noted in the chart [I independently reviewed and interpreted images and report] : I independently reviewed and interpreted images and report [I reviewed the films/CD and additionally noted] : I reviewed the films/CD and additionally noted [FreeTextEntry1] : PAthologic vertebra body fractures T1 and T2, foraminal narrowing C7-T1, T1-2

## 2023-02-15 ENCOUNTER — APPOINTMENT (OUTPATIENT)
Dept: ORTHOPEDIC SURGERY | Facility: CLINIC | Age: 48
End: 2023-02-15
Payer: MEDICARE

## 2023-02-15 VITALS — WEIGHT: 254 LBS | HEIGHT: 69 IN | BODY MASS INDEX: 37.62 KG/M2

## 2023-02-15 PROCEDURE — 99213 OFFICE O/P EST LOW 20 MIN: CPT

## 2023-02-15 NOTE — PHYSICAL EXAM
[Rotation to left] : rotation to left [Rotation to right] : rotation to right [Flexion] : flexion [Extension] : extension [] : negative Tello reflex [de-identified] : motor exam is 5/5 throughout both upper extremities with normal tone, left finger abductors 4/5 . left finger adductors 4/5. \par

## 2023-02-15 NOTE — HISTORY OF PRESENT ILLNESS
[de-identified] : Patient returns to the office today for preoperative visit and follow. He is scheduled to undergo cervical laminectomy C7-T1 & T1-T2 & posterior cervical fusion C5-T4 on 2/27/23 at [facility].\par \par He has a history of multiple myeloma with pathological fractures C7, T1, T2, with cervical spinal stenosis. Symptoms are unchanged since last office visit with left more than right upper extremity radicular, pain, numbness and tingling. He reports that he has received oncological clearance. He has a further follow up appointment with [facility] scheduled for 2/16/23.\par \par Medical clearance to the primary care physician. Dr. Godwin is scheduled for 2/20/23.\par Pre-surgical testing was completed 2/14/23.\par Insurance authorization is noted. \par

## 2023-02-15 NOTE — DISCUSSION/SUMMARY
[Surgical risks reviewed] : Surgical risks reviewed [de-identified] : Together in the office we reviewed the current diagnosis and its contributions to His symptoms.  The planned surgical procedure was reviewed and explained to His satisfaction including the risks and benefits.  This risk benefit conversation included, but was not limited to infection, bleeding, neurological injury, CSF leak, need for dural repair, hardware mal position, pseudoarthrosis, need for additional operation in the future, risks of general anesthesia. Expected outcomes included reduction in pain, improvement in function and expected recovery timeframe. All questions were answered to their satisfaction.\par \par Patient was reminded to bring their diagnostic imaging to the OR on the date of surgery.\par \par \par \par \par \par

## 2023-02-27 ENCOUNTER — APPOINTMENT (OUTPATIENT)
Dept: ORTHOPEDIC SURGERY | Facility: HOSPITAL | Age: 48
End: 2023-02-27
Payer: MEDICARE

## 2023-02-27 PROCEDURE — 22842 INSERT SPINE FIXATION DEVICE: CPT

## 2023-02-27 PROCEDURE — 63045 LAM FACETEC & FORAMOT CRV: CPT

## 2023-02-27 PROCEDURE — 22614 ARTHRD PST TQ 1NTRSPC EA ADD: CPT

## 2023-02-27 PROCEDURE — 63045 LAM FACETEC & FORAMOT CRV: CPT | Mod: AS

## 2023-02-27 PROCEDURE — 22600 ARTHRD PST TQ 1NTRSPC CRV: CPT | Mod: AS

## 2023-02-27 PROCEDURE — 22614 ARTHRD PST TQ 1NTRSPC EA ADD: CPT | Mod: AS

## 2023-02-27 PROCEDURE — 22600 ARTHRD PST TQ 1NTRSPC CRV: CPT

## 2023-02-27 PROCEDURE — 22842 INSERT SPINE FIXATION DEVICE: CPT | Mod: AS

## 2023-03-10 ENCOUNTER — APPOINTMENT (OUTPATIENT)
Dept: ORTHOPEDIC SURGERY | Facility: CLINIC | Age: 48
End: 2023-03-10
Payer: MEDICARE

## 2023-03-10 VITALS — HEIGHT: 69 IN | WEIGHT: 254 LBS | BODY MASS INDEX: 37.62 KG/M2

## 2023-03-10 PROCEDURE — 72040 X-RAY EXAM NECK SPINE 2-3 VW: CPT

## 2023-03-10 PROCEDURE — 72070 X-RAY EXAM THORAC SPINE 2VWS: CPT

## 2023-03-10 PROCEDURE — 99024 POSTOP FOLLOW-UP VISIT: CPT

## 2023-03-10 RX ORDER — METHOCARBAMOL 750 MG/1
750 TABLET, FILM COATED ORAL 3 TIMES DAILY
Qty: 90 | Refills: 1 | Status: ACTIVE | COMMUNITY
Start: 2023-03-10 | End: 1900-01-01

## 2023-03-12 NOTE — HISTORY OF PRESENT ILLNESS
[5] : 5 [7] : 7 [Not working due to injury] : Work status: not working due to injury [] : Post Surgical Visit: yes [de-identified] : no treatment at this time [de-identified] : 2/27/23 [de-identified] : c-spine  [de-identified] : 3/10/2023 - 46 y/o M presenting 1st post op s/p segment posterior cervicothoracic fusion with decompression (2/27/23).  Patient states he is gradually improving each day. experiencing post op fatigue, difficulty keeping his head up. No incisional complaints or constitutional symptoms. Reports almost complete resolution of numbness in b/l UE. Some radicular pain in the UE with positional movements, but significantly improved compared to baseline. C/o shoulder blade muscular pain, tolerable. \par \par \par

## 2023-03-12 NOTE — DISCUSSION/SUMMARY
[Medication Risks Reviewed] : Medication risks reviewed [de-identified] : 48 y/o M s/p segment posterior cervicothoracic fusion with decompression  (2/27/23). Cervicothoracic incision appears to be healing nicely, sutures were removed and steri strips applied. Advised patient on proper wound care and management. X-ray taken in office reveals good maintenance of alignment and implant placement, good progress toward fusion. Explained expected outcomes post op including reduction in pain, improvement in function and expected recovery timeframe. All questions were answered to their satisfaction. Patient will continue to follow up with oncologist as directed. He was provided with a renewal for methocarbamol and oxycodone, medication management and risks reviewed. Patient will follow up in 4 weeks. \par \par Prior to appointment and during encounter with patient extensive medical records were reviewed including but not limited to, hospital records, outpatient records, imaging results, and lab data. During this appointment the patient was examined, diagnoses were discussed and explained in a face to face manner. In addition extensive time was spent reviewing aforementioned diagnostic studies. Counseling including abnormal image results, differential diagnoses, treatment options, risk and benefits, lifestyle changes, current condition, and current medications was performed. Patient's comments, questions, and concerns were addressed and patient verbalized understanding. Based on this patient's presentation at our office, which is an orthopedic spine surgeon's office, this patient inherently / intrinsically has a risk, however minute, of developing issues such as Cauda equina syndrome, bowel and bladder changes, or progression of motor or neurological deficits such as paralysis which may be permanent.\par \par MAHNAZ QUEZADA Acting as a Scribe for Dr. Sarah BROUSSARD, Mahnaz Quezada, attest that this documentation has been prepared under the direction and in the presence of Provider Darrell Ghotra MD.

## 2023-03-30 ENCOUNTER — FORM ENCOUNTER (OUTPATIENT)
Age: 48
End: 2023-03-30

## 2023-04-03 ENCOUNTER — APPOINTMENT (OUTPATIENT)
Dept: ORTHOPEDIC SURGERY | Facility: CLINIC | Age: 48
End: 2023-04-03
Payer: MEDICARE

## 2023-04-03 VITALS — BODY MASS INDEX: 36.29 KG/M2 | WEIGHT: 245 LBS | HEIGHT: 69 IN

## 2023-04-03 PROCEDURE — 99024 POSTOP FOLLOW-UP VISIT: CPT

## 2023-04-03 PROCEDURE — 72040 X-RAY EXAM NECK SPINE 2-3 VW: CPT

## 2023-04-03 PROCEDURE — 72070 X-RAY EXAM THORAC SPINE 2VWS: CPT

## 2023-04-04 NOTE — HISTORY OF PRESENT ILLNESS
[5] : 5 [Not working due to injury] : Work status: not working due to injury [de-identified] : 4/3/23: Patient presenting 2nd post op s/p  segment posterior cervicothoracic fusion with decompression (2/27/23). overall he is recovering well. complains of incisional soreness, controlled. no incisional complaints. strength in the b/l ue improving with time. intermittent tingling in the fingers, mild. states his neck is getting stuck and "clicking", more prominent while looking down. he reports this stuck sensation was present pre op. pre op nerve pain has evaded. follows up with oncology 1 x / month - he is on medication. weening off oxycodone.  \par \par 3/10/2023 - 46 y/o M presenting 1st post op s/p segment posterior cervicothoracic fusion with decompression (2/27/23).  Patient states he is gradually improving each day. experiencing post op fatigue, difficulty keeping his head up. No incisional complaints or constitutional symptoms. Reports almost complete resolution of numbness in b/l UE. Some radicular pain in the UE with positional movements, but significantly improved compared to baseline. C/o shoulder blade muscular pain, tolerable. \par \par \par  [de-identified] : no treatment at this time

## 2023-04-04 NOTE — DATA REVIEWED
[FreeTextEntry1] : Cervical/thoracic x-ray ap/lat  taken in office today reveals good maintenance of alignment and implant placement, good progress toward fusion

## 2023-04-04 NOTE — PHYSICAL EXAM
[de-identified] : Constitutional:\par - General Appearance:\par Unremarkable\par Body Habitus\par Well Developed\par Well Nourished\par Body Habitus\par No Deformities\par Well Groomed\par Ability To communicate:\par Normal\par Neurologic:\par Global sensation is intact to upper and lower extremities. See examination of Neck and/or Spine\par for exceptions.\par Orientation to Time, Place and Person is: Normal\par Mood And Affect is Normal\par Skin:\par - Head/Face, Right Upper/Lower Extremity, Left Upper/Lower Extremity: Normal\par See Examination of Neck and/or Spine for exceptions\par Cardiovascular:\par Peripheral Cardiovascular System is Normal\par Palpation of Lymph Nodes:\par Normal Palpation of lymph nodes in: Axilla, Cervical, Inguinal\par Abnormal Palpation of lymph nodes in: None  [] : no sign of infection [FreeTextEntry3] : Incision appears to be healing nicely

## 2023-04-04 NOTE — DISCUSSION/SUMMARY
[Medication Risks Reviewed] : Medication risks reviewed [de-identified] : 48 y/o M s/p segment posterior cervicothoracic fusion with decompression  (2/27/23). Cervicothoracic incision appears to be healing nicely.  Advised patient on proper wound care and management, gradual increase of physical activity. X-ray taken in office reveals good maintenance of alignment and implant placement, good progress toward fusion. Explained expected outcomes post op including reduction in pain, improvement in function and expected recovery timeframe. All questions were answered to their satisfaction. Patient will continue to follow up with oncologist as directed. C/t treatment with methocarbamol and oxycodone, medication management and risks reviewed. He was provided with a referral to start OPPT. Patient will follow up in 4 weeks. \par \par Prior to appointment and during encounter with patient extensive medical records were reviewed including but not limited to, hospital records, outpatient records, imaging results, and lab data. During this appointment the patient was examined, diagnoses were discussed and explained in a face to face manner. In addition extensive time was spent reviewing aforementioned diagnostic studies. Counseling including abnormal image results, differential diagnoses, treatment options, risk and benefits, lifestyle changes, current condition, and current medications was performed. Patient's comments, questions, and concerns were addressed and patient verbalized understanding. Based on this patient's presentation at our office, which is an orthopedic spine surgeon's office, this patient inherently / intrinsically has a risk, however minute, of developing issues such as Cauda equina syndrome, bowel and bladder changes, or progression of motor or neurological deficits such as paralysis which may be permanent.\par \par MAHNAZ QUEZADA Acting as a Scribe for Dr. Sarah BROUSSARD, Mahnaz Quezada, attest that this documentation has been prepared under the direction and in the presence of Provider Darrell Ghotra MD.

## 2023-04-19 ENCOUNTER — APPOINTMENT (OUTPATIENT)
Dept: ORTHOPEDIC SURGERY | Facility: CLINIC | Age: 48
End: 2023-04-19
Payer: MEDICARE

## 2023-04-19 VITALS — HEIGHT: 69 IN | WEIGHT: 245 LBS | BODY MASS INDEX: 36.29 KG/M2

## 2023-04-19 PROCEDURE — 72040 X-RAY EXAM NECK SPINE 2-3 VW: CPT

## 2023-04-19 PROCEDURE — 72070 X-RAY EXAM THORAC SPINE 2VWS: CPT

## 2023-04-19 PROCEDURE — 99024 POSTOP FOLLOW-UP VISIT: CPT

## 2023-04-23 NOTE — DATA REVIEWED
[Cervical Spine] : cervical spine [Thoracic Spine] : thoracic spine [I independently reviewed and interpreted images and report] : I independently reviewed and interpreted images and report [FreeTextEntry1] : I stop paperwork reviewed

## 2023-04-23 NOTE — DISCUSSION/SUMMARY
[Medication Risks Reviewed] : Medication risks reviewed [de-identified] : 46 y/o M s/p segment posterior cervicothoracic fusion with decompression  (2/27/23). Cervicothoracic incision appears to be healing nicely. We discussed etiology of his symptoms associated with muscle pain. Advised patient on proper wound care and management, gradual increase of physical activity. X-ray taken in office reveals good maintenance of alignment and implant placement, good progress toward fusion. Explained expected outcomes post op including reduction in pain, improvement in function and expected recovery timeframe. All questions were answered to their satisfaction. Patient will continue to follow up with oncologist as directed. He will continue OPPT - renewal provided. Discussed continued weening off oxycodone. Patient will follow up in 4 weeks. \par \par Prior to appointment and during encounter with patient extensive medical records were reviewed including but not limited to, hospital records, outpatient records, imaging results, and lab data. During this appointment the patient was examined, diagnoses were discussed and explained in a face to face manner. In addition extensive time was spent reviewing aforementioned diagnostic studies. Counseling including abnormal image results, differential diagnoses, treatment options, risk and benefits, lifestyle changes, current condition, and current medications was performed. Patient's comments, questions, and concerns were addressed and patient verbalized understanding. Based on this patient's presentation at our office, which is an orthopedic spine surgeon's office, this patient inherently / intrinsically has a risk, however minute, of developing issues such as Cauda equina syndrome, bowel and bladder changes, or progression of motor or neurological deficits such as paralysis which may be permanent.\par \par MAHNAZ QUEZADA Acting as a Scribe for Dr. Sarah BROUSSARD, Mahnaz Quezada, attest that this documentation has been prepared under the direction and in the presence of Provider Darrell Ghotra MD.

## 2023-04-23 NOTE — HISTORY OF PRESENT ILLNESS
[Neck] : neck [5] : 5 [Not working due to injury] : Work status: not working due to injury [de-identified] : 4/19/23: Patient presenting for 3rd post op. Overall, patient was doing well and pain was controlled until Sunday. He states his neck cracked/locked, and pain radiated diffusely into the neck and shoulders. He states "popping" at the base of his skull. Dx eagle syndrome. He reports no radicular UE symptoms. Completely d/c oxycodone on Sunday, he states side effects/sickness feeling. Completing physical therapy.  \par \par 4/3/23: Patient presenting 2nd post op s/p  segment posterior cervicothoracic fusion with decompression (2/27/23). overall he is recovering well. complains of incisional soreness, controlled. no incisional complaints. strength in the b/l ue improving with time. intermittent tingling in the fingers, mild. states his neck is getting stuck and "clicking", more prominent while looking down. he reports this stuck sensation was present pre op. pre op nerve pain has evaded. follows up with oncology 1 x / month - he is on medication. weening off oxycodone.  \par \par 3/10/2023 - 48 y/o M presenting 1st post op s/p segment posterior cervicothoracic fusion with decompression (2/27/23).  Patient states he is gradually improving each day. experiencing post op fatigue, difficulty keeping his head up. No incisional complaints or constitutional symptoms. Reports almost complete resolution of numbness in b/l UE. Some radicular pain in the UE with positional movements, but significantly improved compared to baseline. C/o shoulder blade muscular pain, tolerable. \par \par \par  [de-identified] : Pt presents with concerns about neck locking up and feeling a "POP" on monday which creating shooting pain.

## 2023-04-23 NOTE — PHYSICAL EXAM
[de-identified] : Constitutional:\par - General Appearance:\par Unremarkable\par Body Habitus\par Well Developed\par Well Nourished\par Body Habitus\par No Deformities\par Well Groomed\par Ability To communicate:\par Normal\par Neurologic:\par Global sensation is intact to upper and lower extremities. See examination of Neck and/or Spine\par for exceptions.\par Orientation to Time, Place and Person is: Normal\par Mood And Affect is Normal\par Skin:\par - Head/Face, Right Upper/Lower Extremity, Left Upper/Lower Extremity: Normal\par See Examination of Neck and/or Spine for exceptions\par Cardiovascular:\par Peripheral Cardiovascular System is Normal\par Palpation of Lymph Nodes:\par Normal Palpation of lymph nodes in: Axilla, Cervical, Inguinal\par Abnormal Palpation of lymph nodes in: None  [FreeTextEntry3] : Incision appears to be healing nicely  [] : no sign of infection

## 2023-06-01 ENCOUNTER — APPOINTMENT (OUTPATIENT)
Dept: ORTHOPEDIC SURGERY | Facility: CLINIC | Age: 48
End: 2023-06-01
Payer: MEDICARE

## 2023-06-01 VITALS — HEIGHT: 69 IN | BODY MASS INDEX: 36.29 KG/M2 | WEIGHT: 245 LBS

## 2023-06-01 DIAGNOSIS — M25.561 PAIN IN RIGHT KNEE: ICD-10-CM

## 2023-06-01 DIAGNOSIS — M25.562 PAIN IN LEFT KNEE: ICD-10-CM

## 2023-06-01 PROCEDURE — 99214 OFFICE O/P EST MOD 30 MIN: CPT

## 2023-06-01 PROCEDURE — 73564 X-RAY EXAM KNEE 4 OR MORE: CPT | Mod: 50

## 2023-06-02 NOTE — HISTORY OF PRESENT ILLNESS
[de-identified] : The patient is a 47 year  old right hand dominant male who presents today complaining of bilateral knee pain R>L.  \par Date of Injury/Onset: 1/1/23\par Pain:    At Rest: 0/10 \par With Activity:  8/10 \par Mechanism of injury: gradual onset, no RANDOLPH\par This is not a Work Related Injury being treated under Worker's Compensation.\par This is not an athletic injury occurring associated with an interscholastic or organized sports team.\par Quality of symptoms: sharp posterior pain\par Improves with: rest\par Worse with: prolonged sitting to standing, squatting\par Prior treatment: none\par Prior Imaging: none\par Out of work/sport: not working\par School/Sport/Position/Occupation: fully disabled\par Additional Information: s/p C7-T1 decompressive laminectomy for treatment of cancer\par

## 2023-06-02 NOTE — DATA REVIEWED
[MRI] : MRI [Left] : left [Knee] : knee [Report was reviewed and noted in the chart] : The report was reviewed and noted in the chart [I independently reviewed and interpreted images and report] : I independently reviewed and interpreted images and report [I reviewed the films/CD and additionally noted] : I reviewed the films/CD and additionally noted [FreeTextEntry1] : Previous MRI left knee reveals lesion in tibial plateau.

## 2023-06-02 NOTE — IMAGING
[Bilateral] : knee bilaterally [All Views] : anteroposterior, lateral, skyline, and anteroposterior standing [de-identified] : The patient is a well appearing 47 year old M of their stated age. \par Patient ambulates with a normal gait. \par Negative straight leg raise bilateral \par \par Effected Knee: Right                   	 \par ROM:  0-145 degrees \par Lachman: Negative \par Pivot Shift: Negative\par Anterior Drawer: Negative\par Posterior Drawer / Sag:Negative \par Varus Stress 0 degrees: Stable \par Varus Stress 30 degrees: Stable \par Valgus Stress 0 degrees: Stable\par Valgus Stress 30 degrees: Stable \par Medial Guillermo: Negative\par Lateral Gibson: Negative \par Patella Glide: 2+ \par Patella Apprehension: Negative \par Patella Grind: Negative \par  \par Palpation: \par Medial Joint Line: Nontender \par Lateral Joint Line: Nontender \par Medial Collateral Ligament: Nontender \par Lateral Collateral Ligament/PLC: Nontender \par Distal Femur: Nontender \par Proximal Tibia: Nontender \par Tibial Tubercle: Nontender \par Distal Pole Patella: Nontender \par Quadriceps Tendon: Nontender &  Intact \par Patella Tendon: Nontender &  Intact \par Medial Distal Hamstring/PES: Nontender \par Lateral Distal Hamstring: Nontender & Stable \par Iliotibial Band: Nontender \par Medial Patellofemoral Ligament: Nontender \par Adductor: Nontender \par Proximal GSC-Plantaris: Nontender \par Calf: Supple & Nontender \par  \par Inspection: \par Deformity: No \par Erythema: No \par Ecchymosis: No \par Abrasions: No \par Effusion: No \par Prepatella Bursitis: No \par Neurologic Exam: \par Sensation L4-S1: Grossly Intact \par Motor Exam: \par Quadriceps: 5 out of 5 \par Hamstrings: 5 out of 5 \par EHL: 5 out of 5 \par FHL: 5 out of 5 \par TA: 5 out of 5 \par GS: 5 out of 5 \par Circulatory/Pulses: \par Dorsalis Pedis: 2+ \par Posterior Tibialis: 2+ \par Additional Pertinent Findings: None \par \par >>>>>>>>>>>>>>>>>>>>>>>>>>>\par \par Effected Knee: Left                   	 \par ROM:  0-145 degrees \par Lachman: Negative \par Pivot Shift: Negative \par Anterior Drawer: Negative \par Posterior Drawer / Sag:Negative \par Varus Stress 0 degrees: Stable \par Varus Stress 30 degrees: Stable \par Valgus Stress 0 degrees: Stable \par Valgus Stress 30 degrees: Stable \par Medial Guillermo: Negative \par Lateral Guillermo: Negative \par Patella Glide: 2+ \par Patella Apprehension: Negative \par Patella Grind: Negative \par  \par Palpation: \par Medial Joint Line: Nontender \par Lateral Joint Line: Nontender \par Medial Collateral Ligament: Nontender \par Lateral Collateral Ligament/PLC: Nontender \par Distal Femur: Nontender \par Proximal Tibia: Nontender \par Tibial Tubercle: Nontender \par Distal Pole Patella: Nontender \par Quadriceps Tendon: Nontender &  Intact \par Patella Tendon: Nontender &  Intact \par Medial Distal Hamstring/PES: Nontender \par Lateral Distal Hamstring: Nontender & Stable \par Iliotibial Band: Nontender \par Medial Patellofemoral Ligament: Nontender \par Adductor: Nontender \par Proximal GSC-Plantaris: Nontender \par Calf: Supple & Nontender \par \par Inspection:\par Deformity: No \par Erythema: No \par Ecchymosis: No \par Abrasions: No \par Effusion: No \par Prepatella Bursitis: No \par Neurologic Exam: \par Sensation L-S1: Grossly Intact \par \par Motor Exam: \par Quadriceps: 5 out of 5 \par Hamstrings: 5 out of 5 \par EHL: 5 out of 5 \par FHL: 5 out of 5 \par TA: 5 out of 5 \par GS: 5 out of 5 \par Circulatory/Pulses: \par Dorsalis Pedis: 2+\par Posterior Tibialis: 2+ \par Additional Pertinent Findings: None \par \par  \par Assessment: The patient is a 47 year old male with bilateral knee pain and radiographic and physical exam 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   \par \par The patient’s condition is acute \par Documents/Results Reviewed Today: X-Ray bilateral knee \par Tests/Studies Independently Interpreted Today: X-Ray right knee reveals chondrocalcinosis of medial and lateral menisci, calcification of posterior aspect of quadriceps tendon. X-Ray left knee is benign. Previous MRI left knee reveals lesion in tibial plateau. \par Pertinent findings include: +SLR bilateral. RIGHT KNEE: painful ITB\par Confounding medical conditions/concerns: Multiple myeloma \par \par Plan: Due to worsening pain and instability with Hx of HNP, patient will obtain MRI lumbar spine to evaluate for possible HNP. Patient has proven refractory to all previous conservative treatments including but not limited to home exercises, activity modifications, rest, ice, antiinflammatories etc. The patient will continue following up with Oncologist for further treatment. The patient will follow up with pain management for further evaluation of lumbar spine radiographic imaging. Modify activity as discussed. \par Tests Ordered: MRI Lumbar spine \par Prescription Medications Ordered: Discussed appropriate use of OTC anti-inflammatories and analgesics (including but not limited to Aleve, Advil, Tylenol, Motrin, Ibuprofen, Voltaren gel, etc.) \par Braces/DME Ordered: None \par Activity/Work/Sports Status: None \par Additional Instructions: None\par Follow-Up: With pain management \par \par The patient's current medication management of their orthopedic diagnosis was reviewed today:\par (1) We discussed a comprehensive treatment plan that included possible pharmaceutical management involving the use of prescription strength medications including but not limited to options such as oral Naprosyn 500mg BID, once daily Meloxicam 15 mg, or 500-650 mg Tylenol versus over the counter oral medications and topical prescription NSAID Pennsaid vs over the counter Voltaren gel.  Based on our extensive discussion, the patient declined prescription medication and will use over the counter Advil, Alleve, Voltaren Gel or Tylenol as directed.\par (2) There is a moderate risk of morbidity with further treatment, especially from use of prescription strength medications and possible side effects of these medications which include upset stomach with oral medications, skin reactions to topical medications and cardiac/renal issues with long term use.\par (3) I recommended that the patient follow-up with their medical physician to discuss any significant specific potential issues with long term medication use such as interactions with current medications or with exacerbation of underlying medical comorbidities.\par (4) The benefits and risks associated with use of injectable, oral or topical, prescription and over the counter anti-inflammatory medications were discussed with the patient. The patient voiced understanding of the risks including but not limited to bleeding, stroke, kidney dysfunction, heart disease, and were referred to the black box warning label for further information. \par \par ILaura attest that this documentation has been prepared under the direction and in the presence of Provider Dr. Micah Tobin. \par \par The documentation recorded by the scribe accurately reflects the services I, Dr. Micah Tobin, personally performed and the decisions made by me.\par \par   [FreeTextEntry9] : X-Ray right knee reveals chondrocalcinosis of medial and lateral menisci, calcification of posterior aspect of quadriceps tendon. X-Ray left knee is benign.

## 2023-06-05 ENCOUNTER — APPOINTMENT (OUTPATIENT)
Dept: ORTHOPEDIC SURGERY | Facility: CLINIC | Age: 48
End: 2023-06-05
Payer: MEDICARE

## 2023-06-05 VITALS — BODY MASS INDEX: 37.03 KG/M2 | WEIGHT: 250 LBS | HEIGHT: 69 IN

## 2023-06-05 PROCEDURE — 99214 OFFICE O/P EST MOD 30 MIN: CPT

## 2023-06-12 ENCOUNTER — APPOINTMENT (OUTPATIENT)
Dept: PAIN MANAGEMENT | Facility: CLINIC | Age: 48
End: 2023-06-12

## 2023-06-14 NOTE — DISCUSSION/SUMMARY
[Medication Risks Reviewed] : Medication risks reviewed [de-identified] : 49 y/o M s/p segment posterior cervicothoracic fusion with decompression  (2/27/23). \par Discussed continued gradual increase of physical activity and treatment with OPPT.  \par Explained expected outcomes post op including reduction in pain, improvement in function and expected recovery timeframe. All questions were answered to their satisfaction. Discussed medical mgmt with muscle relaxer, Methocarbamol prescribed.\par Patient will continue to follow up with oncologist as directed. He will continue OPPT - renewal provided. \par F/U in 6 weeks, xray at his next visit. \par \par Prior to appointment and during encounter with patient extensive medical records were reviewed including but not limited to, hospital records, outpatient records, imaging results, and lab data. During this appointment the patient was examined, diagnoses were discussed and explained in a face to face manner. In addition extensive time was spent reviewing aforementioned diagnostic studies. Counseling including abnormal image results, differential diagnoses, treatment options, risk and benefits, lifestyle changes, current condition, and current medications was performed. Patient's comments, questions, and concerns were addressed and patient verbalized understanding. Based on this patient's presentation at our office, which is an orthopedic spine surgeon's office, this patient inherently / intrinsically has a risk, however minute, of developing issues such as Cauda equina syndrome, bowel and bladder changes, or progression of motor or neurological deficits such as paralysis which may be permanent.\par \par MAHNAZ QUEZADA Acting as a Scribe for Dr. Sarah BROUSSARD, Mahnaz Quezada, attest that this documentation has been prepared under the direction and in the presence of Provider Darrell Ghotra MD.

## 2023-06-14 NOTE — HISTORY OF PRESENT ILLNESS
[5] : 5 [2] : 2 [Disabled] : Work status: disabled [de-identified] : 06/05/2023: : Patient presenting in follow up, s/p  segment posterior cervicothoracic fusion with decompression (2/27/23). Baseline numbness and pain in the RT hand is resolved post op. C/o stiffness in the neck, muscle pain at his last visit improved. Radicular symptoms resolved. He reports improvements in sensitivity in the back and numbness in the b/l thighs. Intermittent tingling in the RT hand, tolerable. Completing PT 2 x / week which has been helpful. Pleased with his progress post op. Continues to follow up with oncologist, he is on treatment 1 x / month and continues regular medication - indicated for repeat marrow biopsy in a couple of months. He reports he is in remission. \par \par 4/19/23: Patient presenting for 3rd post op. Overall, patient was doing well and pain was controlled until Sunday. He states his neck cracked/locked, and pain radiated diffusely into the neck and shoulders. He states "popping" at the base of his skull. Dx eagle syndrome. He reports no radicular UE symptoms. Completely d/c oxycodone on Sunday, he states side effects/sickness feeling. Completing physical therapy.  \par \par 4/3/23: Patient presenting 2nd post op s/p  segment posterior cervicothoracic fusion with decompression (2/27/23). overall he is recovering well. complains of incisional soreness, controlled. no incisional complaints. strength in the b/l ue improving with time. intermittent tingling in the fingers, mild. states his neck is getting stuck and "clicking", more prominent while looking down. he reports this stuck sensation was present pre op. pre op nerve pain has evaded. follows up with oncology 1 x / month - he is on medication. weening off oxycodone.  \par \par 3/10/2023 - 46 y/o M presenting 1st post op s/p segment posterior cervicothoracic fusion with decompression (2/27/23).  Patient states he is gradually improving each day. experiencing post op fatigue, difficulty keeping his head up. No incisional complaints or constitutional symptoms. Reports almost complete resolution of numbness in b/l UE. Some radicular pain in the UE with positional movements, but significantly improved compared to baseline. C/o shoulder blade muscular pain, tolerable. \par \par \par  [de-identified] : p/t

## 2023-06-14 NOTE — PHYSICAL EXAM
[Normal Coordination] : normal coordination [Normal DTR UE/LE] : normal DTR UE/LE  [Normal Sensation] : normal sensation [Normal Mood and Affect] : normal mood and affect [Orientated] : orientated [Able to Communicate] : able to communicate [Normal Skin] : normal skin [No Rash] : no rash [No Ulcers] : no ulcers [No Lesions] : no lesions [No obvious lymphadenopathy in areas examined] : no obvious lymphadenopathy in areas examined [Well Developed] : well developed [Peripheral vascular exam is grossly normal] : peripheral vascular exam is grossly normal [No Respiratory Distress] : no respiratory distress [de-identified] : Constitutional:\par - General Appearance:\par Unremarkable\par Body Habitus\par Well Developed\par Well Nourished\par Body Habitus\par No Deformities\par Well Groomed\par Ability To communicate:\par Normal\par Neurologic:\par Global sensation is intact to upper and lower extremities. See examination of Neck and/or Spine\par for exceptions.\par Orientation to Time, Place and Person is: Normal\par Mood And Affect is Normal\par Skin:\par - Head/Face, Right Upper/Lower Extremity, Left Upper/Lower Extremity: Normal\par See Examination of Neck and/or Spine for exceptions\par Cardiovascular:\par Peripheral Cardiovascular System is Normal\par Palpation of Lymph Nodes:\par Normal Palpation of lymph nodes in: Axilla, Cervical, Inguinal\par Abnormal Palpation of lymph nodes in: None  [] : no sign of infection [FreeTextEntry3] : Incision appears to be healing nicely

## 2023-06-14 NOTE — DATA REVIEWED
[FreeTextEntry1] : I stop paperwork reviewed\par Orthopedic progress notes reviewed\par Oncology progress notes reviewed

## 2023-07-12 ENCOUNTER — RESULT REVIEW (OUTPATIENT)
Age: 48
End: 2023-07-12

## 2023-08-07 ENCOUNTER — APPOINTMENT (OUTPATIENT)
Dept: ORTHOPEDIC SURGERY | Facility: CLINIC | Age: 48
End: 2023-08-07
Payer: MEDICARE

## 2023-08-07 VITALS — WEIGHT: 250 LBS | HEIGHT: 69 IN | BODY MASS INDEX: 37.03 KG/M2

## 2023-08-07 DIAGNOSIS — Z94.84 STEM CELLS TRANSPLANT STATUS: ICD-10-CM

## 2023-08-07 DIAGNOSIS — E34.9 ENDOCRINE DISORDER, UNSPECIFIED: ICD-10-CM

## 2023-08-07 DIAGNOSIS — M54.12 RADICULOPATHY, CERVICAL REGION: ICD-10-CM

## 2023-08-07 PROCEDURE — 72040 X-RAY EXAM NECK SPINE 2-3 VW: CPT

## 2023-08-07 PROCEDURE — 99214 OFFICE O/P EST MOD 30 MIN: CPT

## 2023-08-08 ENCOUNTER — APPOINTMENT (OUTPATIENT)
Dept: PAIN MANAGEMENT | Facility: CLINIC | Age: 48
End: 2023-08-08
Payer: MEDICARE

## 2023-08-08 VITALS — HEIGHT: 69 IN | BODY MASS INDEX: 37.03 KG/M2 | WEIGHT: 250 LBS

## 2023-08-08 PROCEDURE — 99214 OFFICE O/P EST MOD 30 MIN: CPT

## 2023-08-08 RX ORDER — METHYLPREDNISOLONE 4 MG/1
4 TABLET ORAL
Qty: 1 | Refills: 0 | Status: DISCONTINUED | COMMUNITY
Start: 2022-05-31 | End: 2023-08-08

## 2023-08-08 RX ORDER — PREGABALIN 75 MG/1
75 CAPSULE ORAL TWICE DAILY
Qty: 60 | Refills: 1 | Status: DISCONTINUED | COMMUNITY
Start: 2022-06-20 | End: 2023-08-08

## 2023-08-08 RX ORDER — METHYLPREDNISOLONE 4 MG/1
4 TABLET ORAL
Qty: 1 | Refills: 0 | Status: DISCONTINUED | COMMUNITY
Start: 2022-05-05 | End: 2023-08-08

## 2023-08-08 RX ORDER — PREGABALIN 75 MG/1
75 CAPSULE ORAL TWICE DAILY
Qty: 60 | Refills: 1 | Status: DISCONTINUED | COMMUNITY
Start: 2023-03-10 | End: 2023-08-08

## 2023-08-08 RX ORDER — OXYCODONE 5 MG/1
5 TABLET ORAL 4 TIMES DAILY
Qty: 120 | Refills: 0 | Status: DISCONTINUED | COMMUNITY
Start: 2023-04-19 | End: 2023-08-08

## 2023-08-08 RX ORDER — METHOCARBAMOL 750 MG/1
750 TABLET, FILM COATED ORAL
Qty: 30 | Refills: 0 | Status: DISCONTINUED | COMMUNITY
Start: 2023-08-07 | End: 2023-08-08

## 2023-08-08 RX ORDER — PREGABALIN 75 MG/1
75 CAPSULE ORAL TWICE DAILY
Qty: 2 | Refills: 0 | Status: DISCONTINUED | COMMUNITY
Start: 2022-05-31 | End: 2023-08-08

## 2023-08-08 RX ORDER — OXYCODONE 5 MG/1
5 TABLET ORAL
Qty: 60 | Refills: 0 | Status: DISCONTINUED | COMMUNITY
Start: 2023-03-10 | End: 2023-08-08

## 2023-08-08 NOTE — ASSESSMENT
[FreeTextEntry1] : A discussion regarding available pain management treatment options occurred with the patient.  These included interventional, rehabilitative, pharmacological, and alternative modalities. We will proceed with the following:    Interventional treatment options:   - Proceed with right PM L5-S1 LESI with fluoroscopic guidance - Patient will obtain most recent CBC from Hematology for my review prior to scheduled injection; history of MM - see additional instructions below    Rehabilitative options:   - consider retrial of physical therapy lumbar spine once adequate relief - participation in active HEP was discussed and encouraged as tolerated  Medication based treatment options:   - Patient states he is not candidate for oral NSAIDs at this time; uncertain clinical reasoning   - Continue Tylenol 500-1000 mg up to TID as needed - Continue methocarbamol 750 mg up to TID as needed for spasm - see additional instructions below    Complementary treatment options:   - Weight management and lifestyle modifications discussed     Additional treatment recommendations as follows:   -  Patient will follow-up with Dr. Ghotra as directed - Follow up 1-2 weeks post injection for assessment of efficacy and further treatment recommendations  The risks, benefits and alternatives of the proposed procedure were explained in detail with the patient. The risks outlined include but are not limited to infection, bleeding, post- dural puncture headache, nerve injury, a temporary increase in pain, failure to resolve symptoms, allergic reaction, and possible elevation of blood sugar in diabetics if using corticosteroid.  All questions were answered to patient's apparent satisfaction and he/she verbalized an understanding.  The documentation recorded by the scribe, in my presence, accurately reflects the service I personally performed and the decisions made by me with my edits as appropriate.   I, Cruz Beltran acting as scribe, attest that this documentation has been prepared under the direction and in the presence of Provider Noe Bentley DO.

## 2023-08-08 NOTE — PHYSICAL EXAM
[de-identified] : Constitutional:   - No acute distress   - Well developed; well nourished    Neurological:   - normal mood and affect   - alert and oriented x 3     Cardiovascular:   - grossly normal   Lumbar Spine Exam:   Inspection: erythema (-)  ecchymosis (-)  rashes (-)  alignment: no scoliosis  Palpation:  Midline lumbar tenderness:            (-)  midline thoracic tenderness:          (-)  Lumbar paraspinal tenderness:  L (-) ; R (-)  thoracic paraspinal tenderness: L (-) ; R (-)  sciatic nerve tenderness :          L (-) ; R (-)  SI joint tenderness:                     L (-) ; R (-)  GTB tenderness:                        L (-);  R (-)   ROM: WNL  pain with extreme flexion and extension  Strength:                                     Right       Left     Hip Flexion:                (5/5)       (5/5)  Quadriceps:               (5/5)       (5/5)  Hamstrings:                (5/5)       (5/5)  Ankle Dorsiflexion:     (5/5)       (5/5)  EHL:                           (5/5)       (5/5)  Ankle Plantarflexion:  (5/5)       (5/5)   Special Tests:  SLR:                            R (+) ; L (+)  Facet loading:             R (-) ; L (-)  SHRUTHI test:                R (-) ; L (-)  Hamstring tightness:   R (+);  L (+)   Neurologic:  SILT throughout right lower extremity  SILT throughout left lower extremity   Reflexes normal and symmetric bilateral lower extremities   Gait:  non- antalgic gait  ambulates without assistive device

## 2023-08-08 NOTE — HISTORY OF PRESENT ILLNESS
[Sudden] : sudden [Dull/Aching] : dull/aching [Sharp] : sharp [Shooting] : shooting [Frequent] : frequent [Leisure] : leisure [Sleep] : sleep [Nothing helps with pain getting better] : Nothing helps with pain getting better [Sitting] : sitting [Not working due to injury] : Work status: not working due to injury [] : no [FreeTextEntry1] : T-spine / C-spine  [FreeTextEntry7] : left arm  [de-identified] : Stretching  [de-identified] : T-spine Epidurals [de-identified] : Dr. Bermeo  [de-identified] : MRI @

## 2023-08-08 NOTE — DATA REVIEWED
[Lumbar Spine] : lumbar spine [Cervical Spine] : cervical spine [MRI] : MRI [Thoracic Spine] : thoracic spine [Report was reviewed and noted in the chart] : The report was reviewed and noted in the chart [I reviewed the films/CD] : I reviewed the films/CD

## 2023-08-15 PROBLEM — M54.12 CERVICAL RADICULOPATHY, ACUTE: Status: ACTIVE | Noted: 2022-05-31

## 2023-08-15 NOTE — DISCUSSION/SUMMARY
[de-identified] : We reviewed the lumbar MRI in detail with a patient and answered all questions to his satisfaction. Recommend he follow up with interventional pain management in consideration of lumbar epidural steroid injection. He will have to monitor his diabetes for potential increase in blood glucose levels. He reports that he is stable with regard to the oncology follow up and is currently in remission. New diagnosis of low testosterone treatment is scheduled to be initiated later this week. With regard to his cervical spine, will see him back in three months for follow up with cervical x-ray.   Prior to appointment and during encounter with patient extensive medical records were reviewed including but not limited to, hospital records, outpatient records, imaging results, and lab data.During this appointment the patient was examined, diagnoses were discussed and explained in a face to face manner. In addition extensive time was spent reviewing aforementioned diagnostic studies. Counseling including abnormal image results, differential diagnoses, treatment options, risk and benefits, lifestyle changes, current condition, and current medications was performed. Patient's comments, questions, and concerns were addressed and patient verbalized understanding. Based on this patient's presentation at our office, which is an orthopedic spine surgeon's office, this patient inherently / intrinsically has a risk, however minute, of developing issues such as Cauda equina syndrome, bowel and bladder changes, or progression of motor or neurological deficits such as paralysis which may be permanent.
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
## 2023-08-15 NOTE — PHYSICAL EXAM
[Normal Coordination] : normal coordination [Normal DTR UE/LE] : normal DTR UE/LE  [Normal Sensation] : normal sensation [Normal Mood and Affect] : normal mood and affect [Orientated] : orientated [Able to Communicate] : able to communicate [Normal Skin] : normal skin [No Rash] : no rash [No Ulcers] : no ulcers [No Lesions] : no lesions [No obvious lymphadenopathy in areas examined] : no obvious lymphadenopathy in areas examined [Well Developed] : well developed [Peripheral vascular exam is grossly normal] : peripheral vascular exam is grossly normal [No Respiratory Distress] : no respiratory distress [de-identified] : Constitutional: - General Appearance: Unremarkable Body Habitus Well Developed Well Nourished Body Habitus No Deformities Well Groomed Ability To communicate: Normal Neurologic: Global sensation is intact to upper and lower extremities. See examination of Neck and/or Spine for exceptions. Orientation to Time, Place and Person is: Normal Mood And Affect is Normal Skin: - Head/Face, Right Upper/Lower Extremity, Left Upper/Lower Extremity: Normal See Examination of Neck and/or Spine for exceptions Cardiovascular: Peripheral Cardiovascular System is Normal Palpation of Lymph Nodes: Normal Palpation of lymph nodes in: Axilla, Cervical, Inguinal Abnormal Palpation of lymph nodes in: None  [Extension] : extension [] : clonus not sustained at ankle [FreeTextEntry9] : Restricted ROM Lumbar spine  [de-identified] : positive straight leg raise bilaterally at 30.

## 2023-09-06 ENCOUNTER — APPOINTMENT (OUTPATIENT)
Dept: PAIN MANAGEMENT | Facility: CLINIC | Age: 48
End: 2023-09-06
Payer: MEDICARE

## 2023-09-06 PROCEDURE — 62323 NJX INTERLAMINAR LMBR/SAC: CPT

## 2023-09-06 NOTE — PROCEDURE
[FreeTextEntry3] : Date of Service: 09/06/2023   Account: [id number]  Patient: JOANNE SANTOS   YOB: 1975  Age: 48 year  Surgeon:      Noe Bentley DO  Assistant:    None  Pre-Operative Diagnosis:         Lumbosacral Radiculitis (M54.17)  Post Operative Diagnosis:       Lumbosacral Radiculitis (M54.17)     Procedure:             Lumbar interlaminar (L5-S1) epidural steroid injection under fluoroscopic guidance  Anesthesia:            MAC  This procedure was carried out using fluoroscopic guidance.  The risks and benefits of the procedure were discussed extensively with the patient.  The consent of the patient was obtained and the following procedure was performed.  A timeout was performed with all essential staff present and the site and side were verified.  The patient was placed in the prone position with a pillow under the abdomen to minimize the lumbar lordosis.  The lumbar area was prepped and draped in a sterile fashion.  Under A/P view with slight cephalad-caudad angulation, the L5-S1 interspace was identified and marked.  Using sterile technique the superficial skin was anesthetized with 1% Lidocaine.  A 20 gauge Tuohy needle was advanced into the epidural space under fluoroscopy using loss of resistance at the L5-S1 level.  After negative aspiration for heme or CSF, an epidurogram was obtained in the A/P and lateral fluoroscopic views using 2-3 cc of Omnipaque contrast confirming epidural placement of the needle.  After this, 5 cc of of a mixture of preservative free normal saline and 40 mg of Kenalog were injected into the epidural space.   Vital signs remained normal throughout the procedure.  The patient tolerated the procedure well.  There were no immediate complications from the performed procedure.  The patient was instructed to apply ice over the injection sites for twenty minutes every two hours for the next 24 hours.  Disposition:      1. The patient was advised to F/U in 1-2 weeks to assess the response to the injection.      2. The patient was also instructed to contact me immediately if there were any concerns related to the procedure performed.

## 2023-09-26 ENCOUNTER — APPOINTMENT (OUTPATIENT)
Dept: PAIN MANAGEMENT | Facility: CLINIC | Age: 48
End: 2023-09-26

## 2023-10-03 ENCOUNTER — APPOINTMENT (OUTPATIENT)
Dept: PAIN MANAGEMENT | Facility: CLINIC | Age: 48
End: 2023-10-03
Payer: MEDICARE

## 2023-10-03 VITALS — HEIGHT: 69 IN | WEIGHT: 250 LBS | BODY MASS INDEX: 37.03 KG/M2

## 2023-10-03 DIAGNOSIS — M51.36 OTHER INTERVERTEBRAL DISC DEGENERATION, LUMBAR REGION: ICD-10-CM

## 2023-10-03 PROCEDURE — 99214 OFFICE O/P EST MOD 30 MIN: CPT

## 2023-10-05 ENCOUNTER — NON-APPOINTMENT (OUTPATIENT)
Age: 48
End: 2023-10-05

## 2023-10-18 ENCOUNTER — APPOINTMENT (OUTPATIENT)
Dept: PAIN MANAGEMENT | Facility: CLINIC | Age: 48
End: 2023-10-18
Payer: MEDICARE

## 2023-10-18 PROCEDURE — 64483 NJX AA&/STRD TFRM EPI L/S 1: CPT | Mod: 50

## 2023-11-08 ENCOUNTER — APPOINTMENT (OUTPATIENT)
Dept: ORTHOPEDIC SURGERY | Facility: CLINIC | Age: 48
End: 2023-11-08
Payer: MEDICARE

## 2023-11-08 VITALS — WEIGHT: 250 LBS | BODY MASS INDEX: 37.03 KG/M2 | HEIGHT: 69 IN

## 2023-11-08 DIAGNOSIS — C90.00 MULTIPLE MYELOMA NOT HAVING ACHIEVED REMISSION: ICD-10-CM

## 2023-11-08 PROCEDURE — 99214 OFFICE O/P EST MOD 30 MIN: CPT

## 2023-11-28 ENCOUNTER — APPOINTMENT (OUTPATIENT)
Dept: PAIN MANAGEMENT | Facility: CLINIC | Age: 48
End: 2023-11-28

## 2023-11-28 PROBLEM — C90.00 MULTIPLE MYELOMA: Status: ACTIVE | Noted: 2022-08-14

## 2024-01-05 ENCOUNTER — APPOINTMENT (OUTPATIENT)
Dept: ORTHOPEDIC SURGERY | Facility: CLINIC | Age: 49
End: 2024-01-05
Payer: MEDICARE

## 2024-01-05 VITALS — WEIGHT: 250 LBS | HEIGHT: 69 IN | BODY MASS INDEX: 37.03 KG/M2

## 2024-01-05 PROCEDURE — 99214 OFFICE O/P EST MOD 30 MIN: CPT

## 2024-01-07 NOTE — DISCUSSION/SUMMARY
[Medication Risks Reviewed] : Medication risks reviewed [de-identified] : Lumbar:  I discussed with the patient that since he has exhausted all conservative treatments in the form of medications, physicain supervised home based exercises > 6 weeks, and ESIs x 2, he is indicated for L5S1 posterior decompression and fusion(TLIF). Given severe bilateral foraminal stenosis and L5 nerve impingement expect need for aggressive bilateral facet resection and recommend concomitant instrumented arthrodesis so as to mitigate chances of developing iatrogenic instability.  Surgical risks, benefits and expected outcomes have been explained to the patient. Patient was understanding and in agreement. Will proceed, follow up with surgical sanders.   Cervical: s/p segment posterior cervicothoracic fusion with decompression  (2/27/23).  Discussed continued gradual increase of physical activity and home stretching exercises.  Explained expected outcomes post op including reduction in pain, improvement in function and expected recovery timeframe. All questions were answered to their satisfaction. Discussed medical mgmt.  Patient will continue to follow up with oncologist as directed.  Prior to appointment and during encounter with patient extensive medical records were reviewed including but not limited to, hospital records, outpatient records, imaging results, and lab data. During this appointment the patient was examined, diagnoses were discussed and explained in a face to face manner. In addition extensive time was spent reviewing aforementioned diagnostic studies. Counseling including abnormal image results, differential diagnoses, treatment options, risk and benefits, lifestyle changes, current condition, and current medications was performed. Patient's comments, questions, and concerns were addressed and patient verbalized understanding. Based on this patient's presentation at our office, which is an orthopedic spine surgeon's office, this patient inherently / intrinsically has a risk, however minute, of developing issues such as Cauda equina syndrome, bowel and bladder changes, or progression of motor or neurological deficits such as paralysis which may be permanent.  MAHNAZ QUEZADA Acting as a Scribe for Dr. Brandin BROUSSARD, Mahnaz Quezada, attest that this documentation has been prepared under the direction and in the presence of Provider Darrell Ghotra MD.

## 2024-01-07 NOTE — PHYSICAL EXAM
[Normal Coordination] : normal coordination [Normal DTR UE/LE] : normal DTR UE/LE  [Normal Sensation] : normal sensation [Normal Mood and Affect] : normal mood and affect [Orientated] : orientated [Able to Communicate] : able to communicate [Normal Skin] : normal skin [No Rash] : no rash [No Ulcers] : no ulcers [No Lesions] : no lesions [No obvious lymphadenopathy in areas examined] : no obvious lymphadenopathy in areas examined [Well Developed] : well developed [Peripheral vascular exam is grossly normal] : peripheral vascular exam is grossly normal [No Respiratory Distress] : no respiratory distress [Extension] : extension [de-identified] : Constitutional: - General Appearance: Unremarkable Body Habitus Well Developed Well Nourished Body Habitus No Deformities Well Groomed Ability To communicate: Normal Neurologic: Global sensation is intact to upper and lower extremities. See examination of Neck and/or Spine for exceptions. Orientation to Time, Place and Person is: Normal Mood And Affect is Normal Skin: - Head/Face, Right Upper/Lower Extremity, Left Upper/Lower Extremity: Normal See Examination of Neck and/or Spine for exceptions Cardiovascular: Peripheral Cardiovascular System is Normal Palpation of Lymph Nodes: Normal Palpation of lymph nodes in: Axilla, Cervical, Inguinal Abnormal Palpation of lymph nodes in: None  [] : clonus not sustained at ankle [FreeTextEntry9] : Restricted ROM Lumbar spine

## 2024-01-07 NOTE — REVIEW OF SYSTEMS
[Joint Pain] : joint pain [Joint Stiffness] : joint stiffness [Negative] : Heme/Lymph [FreeTextEntry9] : c & l-spine
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
## 2024-01-07 NOTE — DATA REVIEWED
[Report was reviewed and noted in the chart] : The report was reviewed and noted in the chart [I independently reviewed and interpreted images and report] : I independently reviewed and interpreted images and report [I reviewed the films/CD and additionally noted] : I reviewed the films/CD and additionally noted [FreeTextEntry1] : I stop paperwork reviewed Pain mgmt progress notes reviewed

## 2024-02-12 ENCOUNTER — NON-APPOINTMENT (OUTPATIENT)
Age: 49
End: 2024-02-12

## 2024-02-14 ENCOUNTER — APPOINTMENT (OUTPATIENT)
Dept: ORTHOPEDIC SURGERY | Facility: CLINIC | Age: 49
End: 2024-02-14
Payer: MEDICARE

## 2024-02-14 VITALS — HEIGHT: 69 IN | WEIGHT: 250 LBS | BODY MASS INDEX: 37.03 KG/M2

## 2024-02-14 PROCEDURE — 99213 OFFICE O/P EST LOW 20 MIN: CPT

## 2024-02-14 NOTE — PHYSICAL EXAM
[Normal Coordination] : normal coordination [Normal DTR UE/LE] : normal DTR UE/LE  [Normal Sensation] : normal sensation [Normal Mood and Affect] : normal mood and affect [Oriented] : oriented [Able to Communicate] : able to communicate [Normal Skin] : normal skin [No Rash] : no rash [No Ulcers] : no ulcers [No Lesions] : no lesions [No obvious lymphadenopathy in areas examined] : no obvious lymphadenopathy in areas examined [Well Developed] : well developed [Peripheral vascular exam is grossly normal] : peripheral vascular exam is grossly normal [No Respiratory Distress] : no respiratory distress [Extension] : extension [de-identified] : Constitutional: - General Appearance: Unremarkable Body Habitus Well Developed Well Nourished Body Habitus No Deformities Well Groomed Ability To communicate: Normal Neurologic: Global sensation is intact to upper and lower extremities. See examination of Neck and/or Spine for exceptions. Orientation to Time, Place and Person is: Normal Mood And Affect is Normal Skin: - Head/Face, Right Upper/Lower Extremity, Left Upper/Lower Extremity: Normal See Examination of Neck and/or Spine for exceptions Cardiovascular: Peripheral Cardiovascular System is Normal Palpation of Lymph Nodes: Normal Palpation of lymph nodes in: Axilla, Cervical, Inguinal Abnormal Palpation of lymph nodes in: None  [] : clonus not sustained at ankle [FreeTextEntry9] : Restricted ROM Lumbar spine
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
## 2024-02-14 NOTE — DISCUSSION/SUMMARY
[Medication Risks Reviewed] : Medication risks reviewed [Surgical risks reviewed] : Surgical risks reviewed [de-identified] : Together in the office we reviewed the current diagnosis and its contributions to His symptoms.  The planned surgical procedure was reviewed and explained to His satisfaction including the risks and benefits.  This risk benefit conversation included, but was not limited to infection, bleeding, neurological injury, CSF leak, need for dural repair, hardware mal position, pseudoarthrosis, need for additional operation in the future, risks of general anesthesia. Expected outcomes included reduction in pain, improvement in function and expected recovery timeframe. All questions were answered to their satisfaction.  Patient was reminded to bring their diagnostic imaging to the OR on the date of surgery.

## 2024-02-26 ENCOUNTER — APPOINTMENT (OUTPATIENT)
Dept: ORTHOPEDIC SURGERY | Facility: HOSPITAL | Age: 49
End: 2024-02-26

## 2024-03-06 ENCOUNTER — RESULT REVIEW (OUTPATIENT)
Age: 49
End: 2024-03-06

## 2024-03-27 ENCOUNTER — APPOINTMENT (OUTPATIENT)
Dept: ORTHOPEDIC SURGERY | Facility: CLINIC | Age: 49
End: 2024-03-27
Payer: COMMERCIAL

## 2024-03-27 VITALS — HEIGHT: 69 IN | WEIGHT: 250 LBS | BODY MASS INDEX: 37.03 KG/M2

## 2024-03-27 DIAGNOSIS — M54.16 RADICULOPATHY, LUMBAR REGION: ICD-10-CM

## 2024-03-27 PROCEDURE — ZZZZZ: CPT

## 2024-03-28 PROBLEM — M54.16 LUMBAR RADICULOPATHY: Status: ACTIVE | Noted: 2023-06-01

## 2024-03-28 NOTE — DATA REVIEWED
[Report was reviewed and noted in the chart] : The report was reviewed and noted in the chart [I reviewed the films/CD and additionally noted] : I reviewed the films/CD and additionally noted [I independently reviewed and interpreted images and report] : I independently reviewed and interpreted images and report [MRI] : MRI [Lumbar Spine] : lumbar spine [FreeTextEntry1] : I stop paperwork reviewed Pain mgmt progress notes reviewed
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
## 2024-03-28 NOTE — DISCUSSION/SUMMARY
[Medication Risks Reviewed] : Medication risks reviewed [de-identified] : Lumbar:  Updated MRI reviewed today. I discussed with the patient that since he has exhausted all conservative treatments in the form of medications, physician supervised home based exercises > 6 months, and ESIs x 2, he is indicated for laminectomy discectomy L4/5 L5S1. Surgical risks, benefits and expected outcomes have been explained to the patient. Patient was understanding and in agreement. Will proceed, follow up with surgical sanders.   Cervical: s/p segment posterior cervicothoracic fusion with decompression (2/27/23).  Discussed continued gradual increase of physical activity and home stretching exercises.  Explained expected outcomes post op including reduction in pain, improvement in function and expected recovery timeframe. All questions were answered to their satisfaction. Discussed medical mgmt.  Patient will continue to follow up with oncologist as directed.  Prior to appointment and during encounter with patient extensive medical records were reviewed including but not limited to, hospital records, outpatient records, imaging results, and lab data. During this appointment the patient was examined, diagnoses were discussed and explained in a face to face manner. In addition extensive time was spent reviewing aforementioned diagnostic studies. Counseling including abnormal image results, differential diagnoses, treatment options, risk and benefits, lifestyle changes, current condition, and current medications was performed. Patient's comments, questions, and concerns were addressed and patient verbalized understanding. Based on this patient's presentation at our office, which is an orthopedic spine surgeon's office, this patient inherently / intrinsically has a risk, however minute, of developing issues such as Cauda equina syndrome, bowel and bladder changes, or progression of motor or neurological deficits such as paralysis which may be permanent.  MAHNAZ QUEZADA Acting as a Scribe for Dr. Brandin BROUSSARD, Mahnaz Quezada, attest that this documentation has been prepared under the direction and in the presence of Provider Darrell Ghotra MD.

## 2024-03-28 NOTE — PHYSICAL EXAM
[Normal Coordination] : normal coordination [Normal Sensation] : normal sensation [Normal DTR UE/LE] : normal DTR UE/LE  [Normal Mood and Affect] : normal mood and affect [Oriented] : oriented [Able to Communicate] : able to communicate [Normal Skin] : normal skin [No Rash] : no rash [No Ulcers] : no ulcers [No Lesions] : no lesions [Well Developed] : well developed [No obvious lymphadenopathy in areas examined] : no obvious lymphadenopathy in areas examined [Peripheral vascular exam is grossly normal] : peripheral vascular exam is grossly normal [No Respiratory Distress] : no respiratory distress [Extension] : extension [de-identified] : Constitutional: - General Appearance: Unremarkable Body Habitus Well Developed Well Nourished Body Habitus No Deformities Well Groomed Ability To communicate: Normal Neurologic: Global sensation is intact to upper and lower extremities. See examination of Neck and/or Spine for exceptions. Orientation to Time, Place and Person is: Normal Mood And Affect is Normal Skin: - Head/Face, Right Upper/Lower Extremity, Left Upper/Lower Extremity: Normal See Examination of Neck and/or Spine for exceptions Cardiovascular: Peripheral Cardiovascular System is Normal Palpation of Lymph Nodes: Normal Palpation of lymph nodes in: Axilla, Cervical, Inguinal Abnormal Palpation of lymph nodes in: None  [] : clonus not sustained at ankle [FreeTextEntry9] : Restricted ROM Lumbar spine

## 2024-03-28 NOTE — REVIEW OF SYSTEMS
[Joint Stiffness] : joint stiffness [Joint Pain] : joint pain [Negative] : Endocrine [FreeTextEntry9] : c & l-spine

## 2024-05-13 ENCOUNTER — APPOINTMENT (OUTPATIENT)
Dept: ORTHOPEDIC SURGERY | Facility: HOSPITAL | Age: 49
End: 2024-05-13
Payer: COMMERCIAL

## 2024-05-13 PROCEDURE — 63030 LAMOT DCMPRN NRV RT 1 LMBR: CPT | Mod: AS,50

## 2024-05-13 PROCEDURE — 63035 LAMOT DCMPRN NRV RT EA ADDL: CPT | Mod: AS,RT

## 2024-05-13 PROCEDURE — 63030 LAMOT DCMPRN NRV RT 1 LMBR: CPT | Mod: 50

## 2024-05-13 PROCEDURE — 63035 LAMOT DCMPRN NRV RT EA ADDL: CPT | Mod: RT

## 2024-05-24 ENCOUNTER — TRANSCRIPTION ENCOUNTER (OUTPATIENT)
Age: 49
End: 2024-05-24

## 2024-06-03 ENCOUNTER — APPOINTMENT (OUTPATIENT)
Dept: ORTHOPEDIC SURGERY | Facility: CLINIC | Age: 49
End: 2024-06-03
Payer: COMMERCIAL

## 2024-06-03 VITALS — WEIGHT: 250 LBS | HEIGHT: 69 IN | BODY MASS INDEX: 37.03 KG/M2

## 2024-06-03 DIAGNOSIS — M48.061 SPINAL STENOSIS, LUMBAR REGION WITHOUT NEUROGENIC CLAUDICATION: ICD-10-CM

## 2024-06-03 PROCEDURE — 99024 POSTOP FOLLOW-UP VISIT: CPT

## 2024-06-03 NOTE — PHYSICAL EXAM
[Normal Coordination] : normal coordination [Normal DTR UE/LE] : normal DTR UE/LE  [Normal Sensation] : normal sensation [Normal Mood and Affect] : normal mood and affect [Oriented] : oriented [Able to Communicate] : able to communicate [Normal Skin] : normal skin [No Rash] : no rash [No Ulcers] : no ulcers [No Lesions] : no lesions [No obvious lymphadenopathy in areas examined] : no obvious lymphadenopathy in areas examined [Well Developed] : well developed [Peripheral vascular exam is grossly normal] : peripheral vascular exam is grossly normal [No Respiratory Distress] : no respiratory distress [de-identified] : Constitutional: - General Appearance: Unremarkable Body Habitus Well Developed Well Nourished Body Habitus No Deformities Well Groomed Ability To communicate: Normal Neurologic: Global sensation is intact to upper and lower extremities. See examination of Neck and/or Spine for exceptions. Orientation to Time, Place and Person is: Normal Mood And Affect is Normal Skin: - Head/Face, Right Upper/Lower Extremity, Left Upper/Lower Extremity: Normal See Examination of Neck and/or Spine for exceptions Cardiovascular: Peripheral Cardiovascular System is Normal Palpation of Lymph Nodes: Normal Palpation of lymph nodes in: Axilla, Cervical, Inguinal Abnormal Palpation of lymph nodes in: None  [] : no sero-sanguinous drainage

## 2024-06-03 NOTE — HISTORY OF PRESENT ILLNESS
[5] : 5 [4] : 4 [Disabled] : Work status: disabled [de-identified] : 06/03/2024 - Patient presenting 1st operative visit L4/5 laminectomy discectomy DOS 5/13/24. Overall recovering nicely. Numbness in right proximal thigh almost resolved. With walking extended distances c/o onset of right shin pain. Left sided thigh burning/numbness. No incisional complaints. Being careful with bending and lifting. Ran out of pain medication. Has been taking Tylenol with relief.   03/27/2024 - Patient presenting in follow up and brings MRI to review. He complains of b/l pain R > L. However, he states his left sided pains have been getting worse. Numbness in the b/l legs. Pain radiating into the left hip. Severity of pain is worse since he was last seen in January and continue to be functionally incapacitating. Symptoms have been refractory to greater than 6 months activity modification, medical mgmt, exercise based rehabilitation and interventional spine injections. He is ready to move forward with surgery as his symptoms have been functionally incapacitating.   01/05/2024 - Patient presenting in follow up or neck and low back and here to discuss L spine surgery. He complains of heightened R > L radiating leg pain, worse with physical activity and bending his knees. Also complains of right sided neck pain with intermittent right scapula pain, no radiating UE pain. MM has been stable. Ultimately, he is ready to move forward with surgery as his symptoms have been functionally incapacitating. He is sometimes hardly able to walk or sit on the couch due to the severity of his pains.   11/08/2023 - Patient returns for follow. C/o low back pain and radiating posterior leg pain R > L. Also c/o weakness/numbness in the leg and swelling in the left toes. Received series of two lumbar epidural injections since he was last seen. 2nd epidural provided short lasting relief for approx 1 week and pain and weakness as returned. He complains of significant difficulty walking due to decreased lower extremity strength and pain. Ultimately he is considering surgery as his symptoms have been functionally incapacitating.  Cervical: C/o muscle pain in the neck but denies radiating arm pain that was present preopertaivetly. He continues to follow up with oncology once per month, MM has been stable.   08/07/2023 - Patient returns for follow. He is now six months status post cervical decompression and instrument effusion. Arm pains remain resolved. He does experience intermittent soreness in the neck. Secondary complaint is lower back pain with bilateral leg symptoms. Right like worse than left in the buttocks, posterior thigh and tingling into the door, some of his right foot. He has undergone epidural steroid injections in the past with some success. He recently completed updated MRI of the lumbar spine  06/05/2023: : Patient presenting in follow up, s/p  segment posterior cervicothoracic fusion with decompression (2/27/23). Baseline numbness and pain in the RT hand is resolved post op. C/o stiffness in the neck, muscle pain at his last visit improved. Radicular symptoms resolved. He reports improvements in sensitivity in the back and numbness in the b/l thighs. Intermittent tingling in the RT hand, tolerable. Completing PT 2 x / week which has been helpful. Pleased with his progress post op. Continues to follow up with oncologist, he is on treatment 1 x / month and continues regular medication - indicated for repeat marrow biopsy in a couple of months. He reports he is in remission.   4/19/23: Patient presenting for 3rd post op. Overall, patient was doing well and pain was controlled until Sunday. He states his neck cracked/locked, and pain radiated diffusely into the neck and shoulders. He states "popping" at the base of his skull. Dx eagle syndrome. He reports no radicular UE symptoms. Completely d/c oxycodone on Sunday, he states side effects/sickness feeling. Completing physical therapy.    4/3/23: Patient presenting 2nd post op s/p  segment posterior cervicothoracic fusion with decompression (2/27/23). overall he is recovering well. complains of incisional soreness, controlled. no incisional complaints. strength in the b/l ue improving with time. intermittent tingling in the fingers, mild. states his neck is getting stuck and "clicking", more prominent while looking down. he reports this stuck sensation was present pre op. pre op nerve pain has evaded. follows up with oncology 1 x / month - he is on medication. weening off oxycodone.    3/10/2023 - 46 y/o M presenting 1st post op s/p segment posterior cervicothoracic fusion with decompression (2/27/23).  Patient states he is gradually improving each day. experiencing post op fatigue, difficulty keeping his head up. No incisional complaints or constitutional symptoms. Reports almost complete resolution of numbness in b/l UE. Some radicular pain in the UE with positional movements, but significantly improved compared to baseline. C/o shoulder blade muscular pain, tolerable.     [de-identified] : p/t consult today 6/3/24

## 2024-06-03 NOTE — DISCUSSION/SUMMARY
[Medication Risks Reviewed] : Medication risks reviewed [de-identified] : Lumbar: s/p L4/5 laminectomy discectomy DOS 5/13/24.  Advised patient on proper wound care and management. Incision is healing nicely, sutures removed today and steri strips applied Discussed continued gradual increase of physical activity and post operative body mechanics. Patient advised to walk short distances more frequently to continue building strength  Explained expected outcomes post op including reduction in pain, improvement in function and expected recovery timeframe. All questions were answered to their satisfaction.  Cervical: s/p segment posterior cervicothoracic fusion with decompression (2/27/23).  Discussed continued gradual increase of physical activity and home stretching exercises.  Explained expected outcomes post op including reduction in pain, improvement in function and expected recovery timeframe. All questions were answered to their satisfaction. Discussed medical mgmt.   Patient will continue to follow up with oncologist as directed. Follow up 6 WKS  Prior to appointment and during encounter with patient extensive medical records were reviewed including but not limited to, hospital records, outpatient records, imaging results, and lab data. During this appointment the patient was examined, diagnoses were discussed and explained in a face to face manner. In addition extensive time was spent reviewing aforementioned diagnostic studies. Counseling including abnormal image results, differential diagnoses, treatment options, risk and benefits, lifestyle changes, current condition, and current medications was performed. Patient's comments, questions, and concerns were addressed and patient verbalized understanding. Based on this patient's presentation at our office, which is an orthopedic spine surgeon's office, this patient inherently / intrinsically has a risk, however minute, of developing issues such as Cauda equina syndrome, bowel and bladder changes, or progression of motor or neurological deficits such as paralysis which may be permanent.  MAHNAZ QUEZADA Acting as a Scribe for Dr. Brandin BROUSSARD, Mahnaz Quezada, attest that this documentation has been prepared under the direction and in the presence of Provider Darrell Ghotra MD.

## 2024-06-28 ENCOUNTER — APPOINTMENT (OUTPATIENT)
Dept: ORTHOPEDIC SURGERY | Facility: CLINIC | Age: 49
End: 2024-06-28
Payer: COMMERCIAL

## 2024-06-28 VITALS — HEIGHT: 69 IN | WEIGHT: 250 LBS | BODY MASS INDEX: 37.03 KG/M2

## 2024-06-28 DIAGNOSIS — M51.26 OTHER INTERVERTEBRAL DISC DISPLACEMENT, LUMBAR REGION: ICD-10-CM

## 2024-06-28 PROCEDURE — 99024 POSTOP FOLLOW-UP VISIT: CPT

## 2024-08-09 ENCOUNTER — APPOINTMENT (OUTPATIENT)
Dept: ORTHOPEDIC SURGERY | Facility: CLINIC | Age: 49
End: 2024-08-09

## 2024-08-09 PROBLEM — M47.812 CERVICAL SPONDYLOSIS: Status: ACTIVE | Noted: 2024-08-09

## 2024-08-09 PROCEDURE — 99024 POSTOP FOLLOW-UP VISIT: CPT

## 2024-08-09 PROCEDURE — 72040 X-RAY EXAM NECK SPINE 2-3 VW: CPT

## 2024-08-10 NOTE — DISCUSSION/SUMMARY
[Medication Risks Reviewed] : Medication risks reviewed [de-identified] : Lumbar: s/p L4/5 laminectomy discectomy DOS 5/13/24.  Discussed continued gradual increase of physical activity and post operative body mechanics. Patient advised to walk short distances more frequently to continue building strength  Explained expected outcomes post op including reduction in pain, improvement in function and expected recovery timeframe. All questions were answered to their satisfaction. Patient was provided with a renewal for lumbar and lower extremity physical therapy to work on stretching, strengthening and range of motion. MRI requested of the L spine w and w/o contrast w sedation to r/o recurrent hnp, pt experiences return of leg pain s/p L spine surgery May 2024.   Cervical: s/p segment posterior cervicothoracic fusion with decompression (2/27/23).  XR today reveals no evidence of hardware failure, good maintenance of alignment and implant placement. Cervical complaints appear to be muscular Discussed continued gradual increase of physical activity and home stretching exercises.  Explained expected outcomes post op including reduction in pain, improvement in function and expected recovery timeframe. All questions were answered to their satisfaction. Discussed medical mgmt.   Patient will continue to follow up with oncologist as directed. No contraindications for thyroid surgery from cervical or lumbar standpoint.  Follow up after lumbar mri   Prior to appointment and during encounter with patient extensive medical records were reviewed including but not limited to, hospital records, outpatient records, imaging results, and lab data. During this appointment the patient was examined, diagnoses were discussed and explained in a face to face manner. In addition extensive time was spent reviewing aforementioned diagnostic studies. Counseling including abnormal image results, differential diagnoses, treatment options, risk and benefits, lifestyle changes, current condition, and current medications was performed. Patient's comments, questions, and concerns were addressed and patient verbalized understanding. Based on this patient's presentation at our office, which is an orthopedic spine surgeon's office, this patient inherently / intrinsically has a risk, however minute, of developing issues such as Cauda equina syndrome, bowel and bladder changes, or progression of motor or neurological deficits such as paralysis which may be permanent.  MAHNAZ QUEZADA Acting as a Scribe for Dr. Brandin BROUSSARD, Mahnaz Quezada, attest that this documentation has been prepared under the direction and in the presence of Provider Darrell Ghotra MD.

## 2024-08-10 NOTE — PHYSICAL EXAM
[Normal Coordination] : normal coordination [Normal DTR UE/LE] : normal DTR UE/LE  [Normal Sensation] : normal sensation [Normal Mood and Affect] : normal mood and affect [Oriented] : oriented [Able to Communicate] : able to communicate [Normal Skin] : normal skin [No Rash] : no rash [No Ulcers] : no ulcers [No Lesions] : no lesions [No obvious lymphadenopathy in areas examined] : no obvious lymphadenopathy in areas examined [Well Developed] : well developed [Peripheral vascular exam is grossly normal] : peripheral vascular exam is grossly normal [No Respiratory Distress] : no respiratory distress [de-identified] : Constitutional: - General Appearance: Unremarkable Body Habitus Well Developed Well Nourished Body Habitus No Deformities Well Groomed Ability To communicate: Normal Neurologic: Global sensation is intact to upper and lower extremities. See examination of Neck and/or Spine for exceptions. Orientation to Time, Place and Person is: Normal Mood And Affect is Normal Skin: - Head/Face, Right Upper/Lower Extremity, Left Upper/Lower Extremity: Normal See Examination of Neck and/or Spine for exceptions Cardiovascular: Peripheral Cardiovascular System is Normal Palpation of Lymph Nodes: Normal Palpation of lymph nodes in: Axilla, Cervical, Inguinal Abnormal Palpation of lymph nodes in: None  [] : clonus not sustained at ankle [de-identified] : diminished sensation lateral distal left leg. Otherwise sensation intact

## 2024-08-10 NOTE — DATA REVIEWED
[FreeTextEntry1] : in office x-rays cervical spine ap/lat. 08/09/2024 demonstrate no evidence of hardware failure, good maintenance of alignment and implant placement

## 2024-08-10 NOTE — DISCUSSION/SUMMARY
[Medication Risks Reviewed] : Medication risks reviewed [de-identified] : Lumbar: s/p L4/5 laminectomy discectomy DOS 5/13/24.  Discussed continued gradual increase of physical activity and post operative body mechanics. Patient advised to walk short distances more frequently to continue building strength  Explained expected outcomes post op including reduction in pain, improvement in function and expected recovery timeframe. All questions were answered to their satisfaction. Patient was provided with a renewal for lumbar and lower extremity physical therapy to work on stretching, strengthening and range of motion. MRI requested of the L spine w and w/o contrast w sedation to r/o recurrent hnp, pt experiences return of leg pain s/p L spine surgery May 2024.   Cervical: s/p segment posterior cervicothoracic fusion with decompression (2/27/23).  XR today reveals no evidence of hardware failure, good maintenance of alignment and implant placement. Cervical complaints appear to be muscular Discussed continued gradual increase of physical activity and home stretching exercises.  Explained expected outcomes post op including reduction in pain, improvement in function and expected recovery timeframe. All questions were answered to their satisfaction. Discussed medical mgmt.   Patient will continue to follow up with oncologist as directed. No contraindications for thyroid surgery from cervical or lumbar standpoint.  Follow up after lumbar mri   Prior to appointment and during encounter with patient extensive medical records were reviewed including but not limited to, hospital records, outpatient records, imaging results, and lab data. During this appointment the patient was examined, diagnoses were discussed and explained in a face to face manner. In addition extensive time was spent reviewing aforementioned diagnostic studies. Counseling including abnormal image results, differential diagnoses, treatment options, risk and benefits, lifestyle changes, current condition, and current medications was performed. Patient's comments, questions, and concerns were addressed and patient verbalized understanding. Based on this patient's presentation at our office, which is an orthopedic spine surgeon's office, this patient inherently / intrinsically has a risk, however minute, of developing issues such as Cauda equina syndrome, bowel and bladder changes, or progression of motor or neurological deficits such as paralysis which may be permanent.  MAHNAZ QUEZADA Acting as a Scribe for Dr. Brandin BROUSSARD, Mahnaz Quezada, attest that this documentation has been prepared under the direction and in the presence of Provider Darrell Ghotra MD.

## 2024-08-10 NOTE — PHYSICAL EXAM
[Normal Coordination] : normal coordination [Normal DTR UE/LE] : normal DTR UE/LE  [Normal Sensation] : normal sensation [Normal Mood and Affect] : normal mood and affect [Oriented] : oriented [Able to Communicate] : able to communicate [Normal Skin] : normal skin [No Rash] : no rash [No Ulcers] : no ulcers [No Lesions] : no lesions [No obvious lymphadenopathy in areas examined] : no obvious lymphadenopathy in areas examined [Well Developed] : well developed [Peripheral vascular exam is grossly normal] : peripheral vascular exam is grossly normal [No Respiratory Distress] : no respiratory distress [de-identified] : Constitutional: - General Appearance: Unremarkable Body Habitus Well Developed Well Nourished Body Habitus No Deformities Well Groomed Ability To communicate: Normal Neurologic: Global sensation is intact to upper and lower extremities. See examination of Neck and/or Spine for exceptions. Orientation to Time, Place and Person is: Normal Mood And Affect is Normal Skin: - Head/Face, Right Upper/Lower Extremity, Left Upper/Lower Extremity: Normal See Examination of Neck and/or Spine for exceptions Cardiovascular: Peripheral Cardiovascular System is Normal Palpation of Lymph Nodes: Normal Palpation of lymph nodes in: Axilla, Cervical, Inguinal Abnormal Palpation of lymph nodes in: None  [] : clonus not sustained at ankle [de-identified] : diminished sensation lateral distal left leg. Otherwise sensation intact

## 2024-08-10 NOTE — HISTORY OF PRESENT ILLNESS
[7] : 7 [5] : 5 [Disabled] : Work status: disabled [de-identified] : 08/09/2024 - Patient presenting 3rd operative visit L4/5 laminectomy discectomy DOS 5/13/24.  Lumbar: He is healing well and has no incisional complains. Enrolled in PT which is overall helpful but he continues to experience soreness. C/o numbness in the left > right toes, numbness anterior left thigh increased with activity. Pain in the R > L legs. R > L back pain worse with extended standing. Also indicated focal knee pain and popping - f/u with Dr. Tobin for this. States no difference in post op - feels after surgery there was resolution of leg pain but it returned.  Cervical: C/o soreness in the neck and upper back region. Headaches.   06/28/2024 - Patient presenting 2nd operative visit L4/5 laminectomy discectomy DOS 5/13/24. Incision on back healing nicely, numbness in right thigh mostly gone, becomes worse when massaging it at PT, still has pain in right distal leg, chronic numbness left thigh. States walking as tolerable. Follows up with oncologist once per month.   06/03/2024 - Patient presenting 1st operative visit L4/5 laminectomy discectomy DOS 5/13/24. Overall recovering nicely. Numbness in right proximal thigh almost resolved. With walking extended distances c/o onset of right shin pain. Left sided thigh burning/numbness. No incisional complaints. Being careful with bending and lifting. Ran out of pain medication. Has been taking Tylenol with relief.   03/27/2024 - Patient presenting in follow up and brings MRI to review. He complains of b/l pain R > L. However, he states his left sided pains have been getting worse. Numbness in the b/l legs. Pain radiating into the left hip. Severity of pain is worse since he was last seen in January and continue to be functionally incapacitating. Symptoms have been refractory to greater than 6 months activity modification, medical mgmt, exercise based rehabilitation and interventional spine injections. He is ready to move forward with surgery as his symptoms have been functionally incapacitating.   01/05/2024 - Patient presenting in follow up or neck and low back and here to discuss L spine surgery. He complains of heightened R > L radiating leg pain, worse with physical activity and bending his knees. Also complains of right sided neck pain with intermittent right scapula pain, no radiating UE pain. MM has been stable. Ultimately, he is ready to move forward with surgery as his symptoms have been functionally incapacitating. He is sometimes hardly able to walk or sit on the couch due to the severity of his pains.   11/08/2023 - Patient returns for follow. C/o low back pain and radiating posterior leg pain R > L. Also c/o weakness/numbness in the leg and swelling in the left toes. Received series of two lumbar epidural injections since he was last seen. 2nd epidural provided short lasting relief for approx 1 week and pain and weakness as returned. He complains of significant difficulty walking due to decreased lower extremity strength and pain. Ultimately he is considering surgery as his symptoms have been functionally incapacitating.  Cervical: C/o muscle pain in the neck but denies radiating arm pain that was present preopertaivetly. He continues to follow up with oncology once per month, MM has been stable.   08/07/2023 - Patient returns for follow. He is now six months status post cervical decompression and instrument effusion. Arm pains remain resolved. He does experience intermittent soreness in the neck. Secondary complaint is lower back pain with bilateral leg symptoms. Right like worse than left in the buttocks, posterior thigh and tingling into the door, some of his right foot. He has undergone epidural steroid injections in the past with some success. He recently completed updated MRI of the lumbar spine  06/05/2023: : Patient presenting in follow up, s/p  segment posterior cervicothoracic fusion with decompression (2/27/23). Baseline numbness and pain in the RT hand is resolved post op. C/o stiffness in the neck, muscle pain at his last visit improved. Radicular symptoms resolved. He reports improvements in sensitivity in the back and numbness in the b/l thighs. Intermittent tingling in the RT hand, tolerable. Completing PT 2 x / week which has been helpful. Pleased with his progress post op. Continues to follow up with oncologist, he is on treatment 1 x / month and continues regular medication - indicated for repeat marrow biopsy in a couple of months. He reports he is in remission.   4/19/23: Patient presenting for 3rd post op. Overall, patient was doing well and pain was controlled until Sunday. He states his neck cracked/locked, and pain radiated diffusely into the neck and shoulders. He states "popping" at the base of his skull. Dx eagle syndrome. He reports no radicular UE symptoms. Completely d/c oxycodone on Sunday, he states side effects/sickness feeling. Completing physical therapy.    4/3/23: Patient presenting 2nd post op s/p  segment posterior cervicothoracic fusion with decompression (2/27/23). overall he is recovering well. complains of incisional soreness, controlled. no incisional complaints. strength in the b/l ue improving with time. intermittent tingling in the fingers, mild. states his neck is getting stuck and "clicking", more prominent while looking down. he reports this stuck sensation was present pre op. pre op nerve pain has evaded. follows up with oncology 1 x / month - he is on medication. weening off oxycodone.    3/10/2023 - 48 y/o M presenting 1st post op s/p segment posterior cervicothoracic fusion with decompression (2/27/23).  Patient states he is gradually improving each day. experiencing post op fatigue, difficulty keeping his head up. No incisional complaints or constitutional symptoms. Reports almost complete resolution of numbness in b/l UE. Some radicular pain in the UE with positional movements, but significantly improved compared to baseline. C/o shoulder blade muscular pain, tolerable.     [de-identified] : p/t

## 2024-08-10 NOTE — HISTORY OF PRESENT ILLNESS
[7] : 7 [5] : 5 [Disabled] : Work status: disabled [de-identified] : 08/09/2024 - Patient presenting 3rd operative visit L4/5 laminectomy discectomy DOS 5/13/24.  Lumbar: He is healing well and has no incisional complains. Enrolled in PT which is overall helpful but he continues to experience soreness. C/o numbness in the left > right toes, numbness anterior left thigh increased with activity. Pain in the R > L legs. R > L back pain worse with extended standing. Also indicated focal knee pain and popping - f/u with Dr. Tobin for this. States no difference in post op - feels after surgery there was resolution of leg pain but it returned.  Cervical: C/o soreness in the neck and upper back region. Headaches.   06/28/2024 - Patient presenting 2nd operative visit L4/5 laminectomy discectomy DOS 5/13/24. Incision on back healing nicely, numbness in right thigh mostly gone, becomes worse when massaging it at PT, still has pain in right distal leg, chronic numbness left thigh. States walking as tolerable. Follows up with oncologist once per month.   06/03/2024 - Patient presenting 1st operative visit L4/5 laminectomy discectomy DOS 5/13/24. Overall recovering nicely. Numbness in right proximal thigh almost resolved. With walking extended distances c/o onset of right shin pain. Left sided thigh burning/numbness. No incisional complaints. Being careful with bending and lifting. Ran out of pain medication. Has been taking Tylenol with relief.   03/27/2024 - Patient presenting in follow up and brings MRI to review. He complains of b/l pain R > L. However, he states his left sided pains have been getting worse. Numbness in the b/l legs. Pain radiating into the left hip. Severity of pain is worse since he was last seen in January and continue to be functionally incapacitating. Symptoms have been refractory to greater than 6 months activity modification, medical mgmt, exercise based rehabilitation and interventional spine injections. He is ready to move forward with surgery as his symptoms have been functionally incapacitating.   01/05/2024 - Patient presenting in follow up or neck and low back and here to discuss L spine surgery. He complains of heightened R > L radiating leg pain, worse with physical activity and bending his knees. Also complains of right sided neck pain with intermittent right scapula pain, no radiating UE pain. MM has been stable. Ultimately, he is ready to move forward with surgery as his symptoms have been functionally incapacitating. He is sometimes hardly able to walk or sit on the couch due to the severity of his pains.   11/08/2023 - Patient returns for follow. C/o low back pain and radiating posterior leg pain R > L. Also c/o weakness/numbness in the leg and swelling in the left toes. Received series of two lumbar epidural injections since he was last seen. 2nd epidural provided short lasting relief for approx 1 week and pain and weakness as returned. He complains of significant difficulty walking due to decreased lower extremity strength and pain. Ultimately he is considering surgery as his symptoms have been functionally incapacitating.  Cervical: C/o muscle pain in the neck but denies radiating arm pain that was present preopertaivetly. He continues to follow up with oncology once per month, MM has been stable.   08/07/2023 - Patient returns for follow. He is now six months status post cervical decompression and instrument effusion. Arm pains remain resolved. He does experience intermittent soreness in the neck. Secondary complaint is lower back pain with bilateral leg symptoms. Right like worse than left in the buttocks, posterior thigh and tingling into the door, some of his right foot. He has undergone epidural steroid injections in the past with some success. He recently completed updated MRI of the lumbar spine  06/05/2023: : Patient presenting in follow up, s/p  segment posterior cervicothoracic fusion with decompression (2/27/23). Baseline numbness and pain in the RT hand is resolved post op. C/o stiffness in the neck, muscle pain at his last visit improved. Radicular symptoms resolved. He reports improvements in sensitivity in the back and numbness in the b/l thighs. Intermittent tingling in the RT hand, tolerable. Completing PT 2 x / week which has been helpful. Pleased with his progress post op. Continues to follow up with oncologist, he is on treatment 1 x / month and continues regular medication - indicated for repeat marrow biopsy in a couple of months. He reports he is in remission.   4/19/23: Patient presenting for 3rd post op. Overall, patient was doing well and pain was controlled until Sunday. He states his neck cracked/locked, and pain radiated diffusely into the neck and shoulders. He states "popping" at the base of his skull. Dx eagle syndrome. He reports no radicular UE symptoms. Completely d/c oxycodone on Sunday, he states side effects/sickness feeling. Completing physical therapy.    4/3/23: Patient presenting 2nd post op s/p  segment posterior cervicothoracic fusion with decompression (2/27/23). overall he is recovering well. complains of incisional soreness, controlled. no incisional complaints. strength in the b/l ue improving with time. intermittent tingling in the fingers, mild. states his neck is getting stuck and "clicking", more prominent while looking down. he reports this stuck sensation was present pre op. pre op nerve pain has evaded. follows up with oncology 1 x / month - he is on medication. weening off oxycodone.    3/10/2023 - 46 y/o M presenting 1st post op s/p segment posterior cervicothoracic fusion with decompression (2/27/23).  Patient states he is gradually improving each day. experiencing post op fatigue, difficulty keeping his head up. No incisional complaints or constitutional symptoms. Reports almost complete resolution of numbness in b/l UE. Some radicular pain in the UE with positional movements, but significantly improved compared to baseline. C/o shoulder blade muscular pain, tolerable.     [de-identified] : p/t

## 2024-08-22 ENCOUNTER — APPOINTMENT (OUTPATIENT)
Dept: ORTHOPEDIC SURGERY | Facility: CLINIC | Age: 49
End: 2024-08-22

## 2024-08-22 DIAGNOSIS — M25.561 PAIN IN RIGHT KNEE: ICD-10-CM

## 2024-08-22 PROCEDURE — 73564 X-RAY EXAM KNEE 4 OR MORE: CPT | Mod: RT

## 2024-08-22 PROCEDURE — 99214 OFFICE O/P EST MOD 30 MIN: CPT

## 2024-08-23 NOTE — IMAGING
[Right] : right knee [de-identified] : The patient is a well appearing 47 year old M of their stated age.  Patient ambulates with a normal gait.  Negative straight leg raise bilateral   Effected Knee: Right                   	  ROM:  0-140 degrees  Lachman: Negative  Pivot Shift: Negative Anterior Drawer: Negative Posterior Drawer / Sag:Negative  Varus Stress 0 degrees: Stable  Varus Stress 30 degrees: Stable  Valgus Stress 0 degrees: Stable Valgus Stress 30 degrees: Stable  Medial Guillermo: POSITIVE  Lateral Gibson: Negative  Patella Glide: 2+  Patella Apprehension: Negative  Patella Grind: Negative    Palpation:  Medial Joint Line: TENDER  Lateral Joint Line: Nontender  Medial Collateral Ligament: Nontender  Lateral Collateral Ligament/PLC: Nontender  Distal Femur: Nontender  Proximal Tibia: Nontender  Tibial Tubercle: Nontender  Distal Pole Patella: Nontender  Quadriceps Tendon: Nontender &  Intact  Patella Tendon: Nontender &  Intact  Medial Distal Hamstring/PES: Nontender  Lateral Distal Hamstring: Nontender & Stable  Iliotibial Band: Nontender  Medial Patellofemoral Ligament: Nontender  Adductor: Nontender  Proximal GSC-Plantaris: Nontender  Calf: Supple & Nontender    Inspection:  Deformity: No  Erythema: No  Ecchymosis: No  Abrasions: No  Effusion: No  Prepatella Bursitis: No  Neurologic Exam:  Sensation L4-S1: Grossly Intact  Motor Exam:  Quadriceps: 5 out of 5  Hamstrings: 5 out of 5  EHL: 5 out of 5  FHL: 5 out of 5  TA: 5 out of 5  GS: 5 out of 5  Circulatory/Pulses:  Dorsalis Pedis: 2+  Posterior Tibialis: 2+  Additional Pertinent Findings: None   >>>>>>>>>>>>>>>>>>>>>>>>>>>  Effected Knee: Left                   	  ROM:  0-145 degrees  Lachman: Negative  Pivot Shift: Negative  Anterior Drawer: Negative  Posterior Drawer / Sag:Negative  Varus Stress 0 degrees: Stable  Varus Stress 30 degrees: Stable  Valgus Stress 0 degrees: Stable  Valgus Stress 30 degrees: Stable  Medial Guillermo: Negative  Lateral Guillermo: Negative  Patella Glide: 2+  Patella Apprehension: Negative  Patella Grind: Negative    Palpation:  Medial Joint Line: Nontender  Lateral Joint Line: Nontender  Medial Collateral Ligament: Nontender  Lateral Collateral Ligament/PLC: Nontender  Distal Femur: Nontender  Proximal Tibia: Nontender  Tibial Tubercle: Nontender  Distal Pole Patella: Nontender  Quadriceps Tendon: Nontender &  Intact  Patella Tendon: Nontender &  Intact  Medial Distal Hamstring/PES: Nontender  Lateral Distal Hamstring: Nontender & Stable  Iliotibial Band: Nontender  Medial Patellofemoral Ligament: Nontender  Adductor: Nontender  Proximal GSC-Plantaris: Nontender  Calf: Supple & Nontender   Inspection: Deformity: No  Erythema: No  Ecchymosis: No  Abrasions: No  Effusion: No  Prepatella Bursitis: No  Neurologic Exam:  Sensation L-S1: Grossly Intact   Motor Exam:  Quadriceps: 5 out of 5  Hamstrings: 5 out of 5  EHL: 5 out of 5  FHL: 5 out of 5  TA: 5 out of 5  GS: 5 out of 5  Circulatory/Pulses:  Dorsalis Pedis: 2+ Posterior Tibialis: 2+  Additional Pertinent Findings: None     Assessment: The patient is a 47-year-old male with bilateral knee pain R>L and radiographic and physical exam findings consistent with possible MMT.  The patient's condition is acute  Documents/Results Reviewed Today: X-Ray Right knee Tests/Studies Independently Interpreted Today: X-Ray right knee reveals evidence of medial and lateral chondrocalcinosis  Pertinent findings include: +SLR bilateral. RIGHT KNEE: 0-140, painful ITB, MJLT, + medial [facility] Confounding medical conditions/concerns: Multiple myeloma   Plan: Due to worsening pain and instability with catching/locking mechanical symptoms, patient will obtain MRI Right knee to evaluate for possible Medial meniscus tear.  In the interim, we reviewed appropriate use of OTC anti-inflammatories as needed for pain, inflammation, and discomfort. Recommended the patient avoid any deep squatting, pivoting, or cutting movements. Modify activity as discussed. The patient will continue following up with Oncologist for further treatment. Modify activity as discussed.  Tests Ordered: MRI right knee Prescription Medications Ordered: Discussed appropriate use of OTC anti-inflammatories and analgesics (including but not limited to Aleve, Advil, Tylenol, Motrin, Ibuprofen, Voltaren gel, etc.)  Braces/DME Ordered: None  Activity/Work/Sports Status: avoid deep squatting  Additional Instructions: None Follow-Up: s/p MRI  The patient's current medication management of their orthopedic diagnosis was reviewed today: (1) We discussed a comprehensive treatment plan that included possible pharmaceutical management involving the use of prescription strength medications including but not limited to options such as oral Naprosyn 500mg BID, once daily Meloxicam 15 mg, or 500-650 mg Tylenol versus over the counter oral medications and topical prescription NSAID Pennsaid vs over the counter Voltaren gel.  Based on our extensive discussion, the patient declined prescription medication and will use over the counter Advil, Alleve, Voltaren Gel or Tylenol as directed. (2) There is a moderate risk of morbidity with further treatment, especially from use of prescription strength medications and possible side effects of these medications which include upset stomach with oral medications, skin reactions to topical medications and cardiac/renal issues with long term use. (3) I recommended that the patient follow-up with their medical physician to discuss any significant specific potential issues with long term medication use such as interactions with current medications or with exacerbation of underlying medical comorbidities. (4) The benefits and risks associated with use of injectable, oral or topical, prescription and over the counter anti-inflammatory medications were discussed with the patient. The patient voiced understanding of the risks including but not limited to bleeding, stroke, kidney dysfunction, heart disease, and were referred to the black box warning label for further information.   Maru BROUSSARD attest that this documentation has been prepared under the direction and in the presence of Provider Dr. Micah Tobin.  The documentation recorded by the scribe accurately reflects the services Dr. Micah BROUSSARD, personally performed and the decisions made by me.   [FreeTextEntry9] : X-Ray right knee reveals evidence of medial and lateral chondrocalcinosis

## 2024-08-23 NOTE — HISTORY OF PRESENT ILLNESS
[de-identified] : The patient is a 49 year  old right hand dominant male who presents today complaining of bilateral knee pain R>L.   Date of Injury/Onset: 1/1/23 Pain:    At Rest: 0/10  With Activity:  9/10  Mechanism of injury: gradual onset, no RANDOLPH This is not a Work Related Injury being treated under Worker's Compensation. This is not an athletic injury occurring associated with an interscholastic or organized sports team. Quality of symptoms: sharp posterior pain Improves with: rest Worse with: prolonged sitting to standing, squatting Prior treatment: none Prior Imaging: none Out of work/sport: not working School/Sport/Position/Occupation: fully disabled Additional Information: s/p C7-T1 decompressive laminectomy for treatment of cancer

## 2024-08-23 NOTE — HISTORY OF PRESENT ILLNESS
[de-identified] : The patient is a 49 year  old right hand dominant male who presents today complaining of bilateral knee pain R>L.   Date of Injury/Onset: 1/1/23 Pain:    At Rest: 0/10  With Activity:  9/10  Mechanism of injury: gradual onset, no RANDOLPH This is not a Work Related Injury being treated under Worker's Compensation. This is not an athletic injury occurring associated with an interscholastic or organized sports team. Quality of symptoms: sharp posterior pain Improves with: rest Worse with: prolonged sitting to standing, squatting Prior treatment: none Prior Imaging: none Out of work/sport: not working School/Sport/Position/Occupation: fully disabled Additional Information: s/p C7-T1 decompressive laminectomy for treatment of cancer

## 2024-08-23 NOTE — IMAGING
[Right] : right knee [de-identified] : The patient is a well appearing 47 year old M of their stated age.  Patient ambulates with a normal gait.  Negative straight leg raise bilateral   Effected Knee: Right                   	  ROM:  0-140 degrees  Lachman: Negative  Pivot Shift: Negative Anterior Drawer: Negative Posterior Drawer / Sag:Negative  Varus Stress 0 degrees: Stable  Varus Stress 30 degrees: Stable  Valgus Stress 0 degrees: Stable Valgus Stress 30 degrees: Stable  Medial Guillermo: POSITIVE  Lateral Gibson: Negative  Patella Glide: 2+  Patella Apprehension: Negative  Patella Grind: Negative    Palpation:  Medial Joint Line: TENDER  Lateral Joint Line: Nontender  Medial Collateral Ligament: Nontender  Lateral Collateral Ligament/PLC: Nontender  Distal Femur: Nontender  Proximal Tibia: Nontender  Tibial Tubercle: Nontender  Distal Pole Patella: Nontender  Quadriceps Tendon: Nontender &  Intact  Patella Tendon: Nontender &  Intact  Medial Distal Hamstring/PES: Nontender  Lateral Distal Hamstring: Nontender & Stable  Iliotibial Band: Nontender  Medial Patellofemoral Ligament: Nontender  Adductor: Nontender  Proximal GSC-Plantaris: Nontender  Calf: Supple & Nontender    Inspection:  Deformity: No  Erythema: No  Ecchymosis: No  Abrasions: No  Effusion: No  Prepatella Bursitis: No  Neurologic Exam:  Sensation L4-S1: Grossly Intact  Motor Exam:  Quadriceps: 5 out of 5  Hamstrings: 5 out of 5  EHL: 5 out of 5  FHL: 5 out of 5  TA: 5 out of 5  GS: 5 out of 5  Circulatory/Pulses:  Dorsalis Pedis: 2+  Posterior Tibialis: 2+  Additional Pertinent Findings: None   >>>>>>>>>>>>>>>>>>>>>>>>>>>  Effected Knee: Left                   	  ROM:  0-145 degrees  Lachman: Negative  Pivot Shift: Negative  Anterior Drawer: Negative  Posterior Drawer / Sag:Negative  Varus Stress 0 degrees: Stable  Varus Stress 30 degrees: Stable  Valgus Stress 0 degrees: Stable  Valgus Stress 30 degrees: Stable  Medial Guillermo: Negative  Lateral Guillermo: Negative  Patella Glide: 2+  Patella Apprehension: Negative  Patella Grind: Negative    Palpation:  Medial Joint Line: Nontender  Lateral Joint Line: Nontender  Medial Collateral Ligament: Nontender  Lateral Collateral Ligament/PLC: Nontender  Distal Femur: Nontender  Proximal Tibia: Nontender  Tibial Tubercle: Nontender  Distal Pole Patella: Nontender  Quadriceps Tendon: Nontender &  Intact  Patella Tendon: Nontender &  Intact  Medial Distal Hamstring/PES: Nontender  Lateral Distal Hamstring: Nontender & Stable  Iliotibial Band: Nontender  Medial Patellofemoral Ligament: Nontender  Adductor: Nontender  Proximal GSC-Plantaris: Nontender  Calf: Supple & Nontender   Inspection: Deformity: No  Erythema: No  Ecchymosis: No  Abrasions: No  Effusion: No  Prepatella Bursitis: No  Neurologic Exam:  Sensation L-S1: Grossly Intact   Motor Exam:  Quadriceps: 5 out of 5  Hamstrings: 5 out of 5  EHL: 5 out of 5  FHL: 5 out of 5  TA: 5 out of 5  GS: 5 out of 5  Circulatory/Pulses:  Dorsalis Pedis: 2+ Posterior Tibialis: 2+  Additional Pertinent Findings: None     Assessment: The patient is a 47-year-old male with bilateral knee pain R>L and radiographic and physical exam findings consistent with possible MMT.  The patient's condition is acute  Documents/Results Reviewed Today: X-Ray Right knee Tests/Studies Independently Interpreted Today: X-Ray right knee reveals evidence of medial and lateral chondrocalcinosis  Pertinent findings include: +SLR bilateral. RIGHT KNEE: 0-140, painful ITB, MJLT, + medial [facility] Confounding medical conditions/concerns: Multiple myeloma   Plan: Due to worsening pain and instability with catching/locking mechanical symptoms, patient will obtain MRI Right knee to evaluate for possible Medial meniscus tear.  In the interim, we reviewed appropriate use of OTC anti-inflammatories as needed for pain, inflammation, and discomfort. Recommended the patient avoid any deep squatting, pivoting, or cutting movements. Modify activity as discussed. The patient will continue following up with Oncologist for further treatment. Modify activity as discussed.  Tests Ordered: MRI right knee Prescription Medications Ordered: Discussed appropriate use of OTC anti-inflammatories and analgesics (including but not limited to Aleve, Advil, Tylenol, Motrin, Ibuprofen, Voltaren gel, etc.)  Braces/DME Ordered: None  Activity/Work/Sports Status: avoid deep squatting  Additional Instructions: None Follow-Up: s/p MRI  The patient's current medication management of their orthopedic diagnosis was reviewed today: (1) We discussed a comprehensive treatment plan that included possible pharmaceutical management involving the use of prescription strength medications including but not limited to options such as oral Naprosyn 500mg BID, once daily Meloxicam 15 mg, or 500-650 mg Tylenol versus over the counter oral medications and topical prescription NSAID Pennsaid vs over the counter Voltaren gel.  Based on our extensive discussion, the patient declined prescription medication and will use over the counter Advil, Alleve, Voltaren Gel or Tylenol as directed. (2) There is a moderate risk of morbidity with further treatment, especially from use of prescription strength medications and possible side effects of these medications which include upset stomach with oral medications, skin reactions to topical medications and cardiac/renal issues with long term use. (3) I recommended that the patient follow-up with their medical physician to discuss any significant specific potential issues with long term medication use such as interactions with current medications or with exacerbation of underlying medical comorbidities. (4) The benefits and risks associated with use of injectable, oral or topical, prescription and over the counter anti-inflammatory medications were discussed with the patient. The patient voiced understanding of the risks including but not limited to bleeding, stroke, kidney dysfunction, heart disease, and were referred to the black box warning label for further information.   Maru BROUSSARD attest that this documentation has been prepared under the direction and in the presence of Provider Dr. Micah Tobin.  The documentation recorded by the scribe accurately reflects the services Dr. Micah BROUSSARD, personally performed and the decisions made by me.   [FreeTextEntry9] : X-Ray right knee reveals evidence of medial and lateral chondrocalcinosis

## 2024-09-04 ENCOUNTER — RESULT REVIEW (OUTPATIENT)
Age: 49
End: 2024-09-04

## 2024-09-11 ENCOUNTER — APPOINTMENT (OUTPATIENT)
Dept: ORTHOPEDIC SURGERY | Facility: CLINIC | Age: 49
End: 2024-09-11

## 2024-09-11 VITALS — WEIGHT: 250 LBS | BODY MASS INDEX: 37.03 KG/M2 | HEIGHT: 69 IN

## 2024-09-11 DIAGNOSIS — M47.812 SPONDYLOSIS W/OUT MYELOPATHY OR RADICULOPATHY, CERVICAL REGION: ICD-10-CM

## 2024-09-11 DIAGNOSIS — M51.26 OTHER INTERVERTEBRAL DISC DISPLACEMENT, LUMBAR REGION: ICD-10-CM

## 2024-09-11 DIAGNOSIS — M54.16 RADICULOPATHY, LUMBAR REGION: ICD-10-CM

## 2024-09-11 PROCEDURE — 99212 OFFICE O/P EST SF 10 MIN: CPT

## 2024-09-12 ENCOUNTER — RESULT REVIEW (OUTPATIENT)
Age: 49
End: 2024-09-12

## 2024-09-18 NOTE — DISCUSSION/SUMMARY
[Medication Risks Reviewed] : Medication risks reviewed [de-identified] : Lumbar: s/p L4/5 laminectomy discectomy DOS 5/13/24.  Patient is four months status post decompressive lumbar spinal surgery. MRI imaging shows satisfactory decompression. Recommend continue to conservative management and postoperative rehabilitation for lower back. Patient should focus on home exercises, core muscle strengthening and lower extremity flexibility training. Will follow up with Dr. Tobin for treatment of the right knee. We briefly discussed interventional pain management options for his upper back, which may include trigger injections or possibly Botox injections for persistent muscles spasms. Patient will make an appointment with pain management to discuss upper thoracic management options  Cervical: s/p segment posterior cervicothoracic fusion with decompression (2/27/23).  Discussed continued gradual increase of physical activity and home stretching exercises.  Explained expected outcomes post op including reduction in pain, improvement in function and expected recovery timeframe. All questions were answered to their satisfaction. Discussed medical mgmt.   Patient will continue to follow up with oncologist as directed.  Prior to appointment and during encounter with patient extensive medical records were reviewed including but not limited to, hospital records, outpatient records, imaging results, and lab data. During this appointment the patient was examined, diagnoses were discussed and explained in a face to face manner. In addition extensive time was spent reviewing aforementioned diagnostic studies. Counseling including abnormal image results, differential diagnoses, treatment options, risk and benefits, lifestyle changes, current condition, and current medications was performed. Patient's comments, questions, and concerns were addressed and patient verbalized understanding. Based on this patient's presentation at our office, which is an orthopedic spine surgeon's office, this patient inherently / intrinsically has a risk, however minute, of developing issues such as Cauda equina syndrome, bowel and bladder changes, or progression of motor or neurological deficits such as paralysis which may be permanent.   I, [ANTWAN Charles], certify that the clinical information was reviewed with Dr. Ghotra, who was physically present in the office. He agreed with the above plan of care and was available for consultation as necessary during the office visit.

## 2024-09-18 NOTE — DATA REVIEWED
[FreeTextEntry1] : On my interpretation of these images and reports MRI lumbar spine with without contrast 9/4/24 ZP No evidence of new inflammatory or neoplastic process. No evidence of infection. Post surgical changes demonstrating improvement after surgery and satisfactory resection of L4/5 disc L5/S1 disc herniation  I stop paperwork reviewed

## 2024-09-18 NOTE — PHYSICAL EXAM
[Normal Coordination] : normal coordination [Normal DTR UE/LE] : normal DTR UE/LE  [Normal Sensation] : normal sensation [Normal Mood and Affect] : normal mood and affect [Oriented] : oriented [Able to Communicate] : able to communicate [Normal Skin] : normal skin [No Rash] : no rash [No Ulcers] : no ulcers [No Lesions] : no lesions [No obvious lymphadenopathy in areas examined] : no obvious lymphadenopathy in areas examined [Well Developed] : well developed [Peripheral vascular exam is grossly normal] : peripheral vascular exam is grossly normal [No Respiratory Distress] : no respiratory distress [de-identified] : Constitutional: - General Appearance: Unremarkable Body Habitus Well Developed Well Nourished Body Habitus No Deformities Well Groomed Ability To communicate: Normal Neurologic: Global sensation is intact to upper and lower extremities. See examination of Neck and/or Spine for exceptions. Orientation to Time, Place and Person is: Normal Mood And Affect is Normal Skin: - Head/Face, Right Upper/Lower Extremity, Left Upper/Lower Extremity: Normal See Examination of Neck and/or Spine for exceptions Cardiovascular: Peripheral Cardiovascular System is Normal Palpation of Lymph Nodes: Normal Palpation of lymph nodes in: Axilla, Cervical, Inguinal Abnormal Palpation of lymph nodes in: None  [] : clonus not sustained at ankle [de-identified] : diminished sensation lateral distal left leg. Otherwise sensation intact

## 2024-09-18 NOTE — HISTORY OF PRESENT ILLNESS
[7] : 7 [5] : 5 [Disabled] : Work status: disabled [de-identified] : 09/11/2024 - Patient returns to the office to review MRI lumbar spine. He continues to report back pain with increased physical demand activities, such as washing dishes or folding laundry. There is some pain down the leg on the left side. He does have articular calcifications the right knee and his follow with Dr. Tobin for treatment.  08/09/2024 - Patient presenting 3rd operative visit L4/5 laminectomy discectomy DOS 5/13/24.  Lumbar: He is healing well and has no incisional complains. Enrolled in PT which is overall helpful but he continues to experience soreness. C/o numbness in the left > right toes, numbness anterior left thigh increased with activity. Pain in the R > L legs. R > L back pain worse with extended standing. Also indicated focal knee pain and popping - f/u with Dr. Tobin for this. States no difference in post op - feels after surgery there was resolution of leg pain but it returned.  Cervical: C/o soreness in the neck and upper back region. Headaches.   06/28/2024 - Patient presenting 2nd operative visit L4/5 laminectomy discectomy DOS 5/13/24. Incision on back healing nicely, numbness in right thigh mostly gone, becomes worse when massaging it at PT, still has pain in right distal leg, chronic numbness left thigh. States walking as tolerable. Follows up with oncologist once per month.   06/03/2024 - Patient presenting 1st operative visit L4/5 laminectomy discectomy DOS 5/13/24. Overall recovering nicely. Numbness in right proximal thigh almost resolved. With walking extended distances c/o onset of right shin pain. Left sided thigh burning/numbness. No incisional complaints. Being careful with bending and lifting. Ran out of pain medication. Has been taking Tylenol with relief.   03/27/2024 - Patient presenting in follow up and brings MRI to review. He complains of b/l pain R > L. However, he states his left sided pains have been getting worse. Numbness in the b/l legs. Pain radiating into the left hip. Severity of pain is worse since he was last seen in January and continue to be functionally incapacitating. Symptoms have been refractory to greater than 6 months activity modification, medical mgmt, exercise based rehabilitation and interventional spine injections. He is ready to move forward with surgery as his symptoms have been functionally incapacitating.   01/05/2024 - Patient presenting in follow up or neck and low back and here to discuss L spine surgery. He complains of heightened R > L radiating leg pain, worse with physical activity and bending his knees. Also complains of right sided neck pain with intermittent right scapula pain, no radiating UE pain. MM has been stable. Ultimately, he is ready to move forward with surgery as his symptoms have been functionally incapacitating. He is sometimes hardly able to walk or sit on the couch due to the severity of his pains.   11/08/2023 - Patient returns for follow. C/o low back pain and radiating posterior leg pain R > L. Also c/o weakness/numbness in the leg and swelling in the left toes. Received series of two lumbar epidural injections since he was last seen. 2nd epidural provided short lasting relief for approx 1 week and pain and weakness as returned. He complains of significant difficulty walking due to decreased lower extremity strength and pain. Ultimately he is considering surgery as his symptoms have been functionally incapacitating.  Cervical: C/o muscle pain in the neck but denies radiating arm pain that was present preopertaivetly. He continues to follow up with oncology once per month, MM has been stable.   08/07/2023 - Patient returns for follow. He is now six months status post cervical decompression and instrument effusion. Arm pains remain resolved. He does experience intermittent soreness in the neck. Secondary complaint is lower back pain with bilateral leg symptoms. Right like worse than left in the buttocks, posterior thigh and tingling into the door, some of his right foot. He has undergone epidural steroid injections in the past with some success. He recently completed updated MRI of the lumbar spine  06/05/2023: : Patient presenting in follow up, s/p  segment posterior cervicothoracic fusion with decompression (2/27/23). Baseline numbness and pain in the RT hand is resolved post op. C/o stiffness in the neck, muscle pain at his last visit improved. Radicular symptoms resolved. He reports improvements in sensitivity in the back and numbness in the b/l thighs. Intermittent tingling in the RT hand, tolerable. Completing PT 2 x / week which has been helpful. Pleased with his progress post op. Continues to follow up with oncologist, he is on treatment 1 x / month and continues regular medication - indicated for repeat marrow biopsy in a couple of months. He reports he is in remission.   4/19/23: Patient presenting for 3rd post op. Overall, patient was doing well and pain was controlled until Sunday. He states his neck cracked/locked, and pain radiated diffusely into the neck and shoulders. He states "popping" at the base of his skull. Dx eagle syndrome. He reports no radicular UE symptoms. Completely d/c oxycodone on Sunday, he states side effects/sickness feeling. Completing physical therapy.    4/3/23: Patient presenting 2nd post op s/p  segment posterior cervicothoracic fusion with decompression (2/27/23). overall he is recovering well. complains of incisional soreness, controlled. no incisional complaints. strength in the b/l ue improving with time. intermittent tingling in the fingers, mild. states his neck is getting stuck and "clicking", more prominent while looking down. he reports this stuck sensation was present pre op. pre op nerve pain has evaded. follows up with oncology 1 x / month - he is on medication. weening off oxycodone.    3/10/2023 - 48 y/o M presenting 1st post op s/p segment posterior cervicothoracic fusion with decompression (2/27/23).  Patient states he is gradually improving each day. experiencing post op fatigue, difficulty keeping his head up. No incisional complaints or constitutional symptoms. Reports almost complete resolution of numbness in b/l UE. Some radicular pain in the UE with positional movements, but significantly improved compared to baseline. C/o shoulder blade muscular pain, tolerable.     [de-identified] : p/t

## 2024-09-18 NOTE — DISCUSSION/SUMMARY
[Medication Risks Reviewed] : Medication risks reviewed [de-identified] : Lumbar: s/p L4/5 laminectomy discectomy DOS 5/13/24.  Patient is four months status post decompressive lumbar spinal surgery. MRI imaging shows satisfactory decompression. Recommend continue to conservative management and postoperative rehabilitation for lower back. Patient should focus on home exercises, core muscle strengthening and lower extremity flexibility training. Will follow up with Dr. Tobin for treatment of the right knee. We briefly discussed interventional pain management options for his upper back, which may include trigger injections or possibly Botox injections for persistent muscles spasms. Patient will make an appointment with pain management to discuss upper thoracic management options  Cervical: s/p segment posterior cervicothoracic fusion with decompression (2/27/23).  Discussed continued gradual increase of physical activity and home stretching exercises.  Explained expected outcomes post op including reduction in pain, improvement in function and expected recovery timeframe. All questions were answered to their satisfaction. Discussed medical mgmt.   Patient will continue to follow up with oncologist as directed.  Prior to appointment and during encounter with patient extensive medical records were reviewed including but not limited to, hospital records, outpatient records, imaging results, and lab data. During this appointment the patient was examined, diagnoses were discussed and explained in a face to face manner. In addition extensive time was spent reviewing aforementioned diagnostic studies. Counseling including abnormal image results, differential diagnoses, treatment options, risk and benefits, lifestyle changes, current condition, and current medications was performed. Patient's comments, questions, and concerns were addressed and patient verbalized understanding. Based on this patient's presentation at our office, which is an orthopedic spine surgeon's office, this patient inherently / intrinsically has a risk, however minute, of developing issues such as Cauda equina syndrome, bowel and bladder changes, or progression of motor or neurological deficits such as paralysis which may be permanent.   I, [ANTWAN Charles], certify that the clinical information was reviewed with Dr. Ghotra, who was physically present in the office. He agreed with the above plan of care and was available for consultation as necessary during the office visit.

## 2024-09-18 NOTE — PHYSICAL EXAM
[Normal Coordination] : normal coordination [Normal DTR UE/LE] : normal DTR UE/LE  [Normal Sensation] : normal sensation [Normal Mood and Affect] : normal mood and affect [Oriented] : oriented [Able to Communicate] : able to communicate [Normal Skin] : normal skin [No Rash] : no rash [No Ulcers] : no ulcers [No Lesions] : no lesions [No obvious lymphadenopathy in areas examined] : no obvious lymphadenopathy in areas examined [Well Developed] : well developed [Peripheral vascular exam is grossly normal] : peripheral vascular exam is grossly normal [No Respiratory Distress] : no respiratory distress [de-identified] : Constitutional: - General Appearance: Unremarkable Body Habitus Well Developed Well Nourished Body Habitus No Deformities Well Groomed Ability To communicate: Normal Neurologic: Global sensation is intact to upper and lower extremities. See examination of Neck and/or Spine for exceptions. Orientation to Time, Place and Person is: Normal Mood And Affect is Normal Skin: - Head/Face, Right Upper/Lower Extremity, Left Upper/Lower Extremity: Normal See Examination of Neck and/or Spine for exceptions Cardiovascular: Peripheral Cardiovascular System is Normal Palpation of Lymph Nodes: Normal Palpation of lymph nodes in: Axilla, Cervical, Inguinal Abnormal Palpation of lymph nodes in: None  [] : clonus not sustained at ankle [de-identified] : diminished sensation lateral distal left leg. Otherwise sensation intact

## 2024-09-18 NOTE — HISTORY OF PRESENT ILLNESS
[7] : 7 [5] : 5 [Disabled] : Work status: disabled [de-identified] : 09/11/2024 - Patient returns to the office to review MRI lumbar spine. He continues to report back pain with increased physical demand activities, such as washing dishes or folding laundry. There is some pain down the leg on the left side. He does have articular calcifications the right knee and his follow with Dr. Tobin for treatment.  08/09/2024 - Patient presenting 3rd operative visit L4/5 laminectomy discectomy DOS 5/13/24.  Lumbar: He is healing well and has no incisional complains. Enrolled in PT which is overall helpful but he continues to experience soreness. C/o numbness in the left > right toes, numbness anterior left thigh increased with activity. Pain in the R > L legs. R > L back pain worse with extended standing. Also indicated focal knee pain and popping - f/u with Dr. Tobin for this. States no difference in post op - feels after surgery there was resolution of leg pain but it returned.  Cervical: C/o soreness in the neck and upper back region. Headaches.   06/28/2024 - Patient presenting 2nd operative visit L4/5 laminectomy discectomy DOS 5/13/24. Incision on back healing nicely, numbness in right thigh mostly gone, becomes worse when massaging it at PT, still has pain in right distal leg, chronic numbness left thigh. States walking as tolerable. Follows up with oncologist once per month.   06/03/2024 - Patient presenting 1st operative visit L4/5 laminectomy discectomy DOS 5/13/24. Overall recovering nicely. Numbness in right proximal thigh almost resolved. With walking extended distances c/o onset of right shin pain. Left sided thigh burning/numbness. No incisional complaints. Being careful with bending and lifting. Ran out of pain medication. Has been taking Tylenol with relief.   03/27/2024 - Patient presenting in follow up and brings MRI to review. He complains of b/l pain R > L. However, he states his left sided pains have been getting worse. Numbness in the b/l legs. Pain radiating into the left hip. Severity of pain is worse since he was last seen in January and continue to be functionally incapacitating. Symptoms have been refractory to greater than 6 months activity modification, medical mgmt, exercise based rehabilitation and interventional spine injections. He is ready to move forward with surgery as his symptoms have been functionally incapacitating.   01/05/2024 - Patient presenting in follow up or neck and low back and here to discuss L spine surgery. He complains of heightened R > L radiating leg pain, worse with physical activity and bending his knees. Also complains of right sided neck pain with intermittent right scapula pain, no radiating UE pain. MM has been stable. Ultimately, he is ready to move forward with surgery as his symptoms have been functionally incapacitating. He is sometimes hardly able to walk or sit on the couch due to the severity of his pains.   11/08/2023 - Patient returns for follow. C/o low back pain and radiating posterior leg pain R > L. Also c/o weakness/numbness in the leg and swelling in the left toes. Received series of two lumbar epidural injections since he was last seen. 2nd epidural provided short lasting relief for approx 1 week and pain and weakness as returned. He complains of significant difficulty walking due to decreased lower extremity strength and pain. Ultimately he is considering surgery as his symptoms have been functionally incapacitating.  Cervical: C/o muscle pain in the neck but denies radiating arm pain that was present preopertaivetly. He continues to follow up with oncology once per month, MM has been stable.   08/07/2023 - Patient returns for follow. He is now six months status post cervical decompression and instrument effusion. Arm pains remain resolved. He does experience intermittent soreness in the neck. Secondary complaint is lower back pain with bilateral leg symptoms. Right like worse than left in the buttocks, posterior thigh and tingling into the door, some of his right foot. He has undergone epidural steroid injections in the past with some success. He recently completed updated MRI of the lumbar spine  06/05/2023: : Patient presenting in follow up, s/p  segment posterior cervicothoracic fusion with decompression (2/27/23). Baseline numbness and pain in the RT hand is resolved post op. C/o stiffness in the neck, muscle pain at his last visit improved. Radicular symptoms resolved. He reports improvements in sensitivity in the back and numbness in the b/l thighs. Intermittent tingling in the RT hand, tolerable. Completing PT 2 x / week which has been helpful. Pleased with his progress post op. Continues to follow up with oncologist, he is on treatment 1 x / month and continues regular medication - indicated for repeat marrow biopsy in a couple of months. He reports he is in remission.   4/19/23: Patient presenting for 3rd post op. Overall, patient was doing well and pain was controlled until Sunday. He states his neck cracked/locked, and pain radiated diffusely into the neck and shoulders. He states "popping" at the base of his skull. Dx eagle syndrome. He reports no radicular UE symptoms. Completely d/c oxycodone on Sunday, he states side effects/sickness feeling. Completing physical therapy.    4/3/23: Patient presenting 2nd post op s/p  segment posterior cervicothoracic fusion with decompression (2/27/23). overall he is recovering well. complains of incisional soreness, controlled. no incisional complaints. strength in the b/l ue improving with time. intermittent tingling in the fingers, mild. states his neck is getting stuck and "clicking", more prominent while looking down. he reports this stuck sensation was present pre op. pre op nerve pain has evaded. follows up with oncology 1 x / month - he is on medication. weening off oxycodone.    3/10/2023 - 48 y/o M presenting 1st post op s/p segment posterior cervicothoracic fusion with decompression (2/27/23).  Patient states he is gradually improving each day. experiencing post op fatigue, difficulty keeping his head up. No incisional complaints or constitutional symptoms. Reports almost complete resolution of numbness in b/l UE. Some radicular pain in the UE with positional movements, but significantly improved compared to baseline. C/o shoulder blade muscular pain, tolerable.     [de-identified] : p/t

## 2024-09-19 ENCOUNTER — APPOINTMENT (OUTPATIENT)
Dept: ORTHOPEDIC SURGERY | Facility: CLINIC | Age: 49
End: 2024-09-19
Payer: COMMERCIAL

## 2024-09-19 VITALS — HEIGHT: 69 IN | WEIGHT: 250 LBS | BODY MASS INDEX: 37.03 KG/M2

## 2024-09-19 DIAGNOSIS — M25.561 PAIN IN RIGHT KNEE: ICD-10-CM

## 2024-09-19 PROCEDURE — 99204 OFFICE O/P NEW MOD 45 MIN: CPT

## 2024-09-19 NOTE — HISTORY OF PRESENT ILLNESS
[de-identified] : The patient is a 49 year  old right hand dominant male who presents today complaining of bilateral knee pain R>L.   Date of Injury/Onset: 1/1/23 Pain:    At Rest: 0/10  With Activity:  10/10  Mechanism of injury: gradual onset, no RANDOLPH This is not a Work Related Injury being treated under Worker's Compensation. This is not an athletic injury occurring associated with an interscholastic or organized sports team. Quality of symptoms: sharp posterior pain, popping, shin pain  Improves with: rest Worse with: prolonged sitting to standing, squatting Treatment/Imaging/Studies Since Last Visit: MRI R knee ZP  	Reports Available For Review Today: MRI report  Out of work/sport: not working School/Sport/Position/Occupation: fully disabled Changes since last visit: Feeling worse. Here to review R knee MRI results. Reports sharp pain, popping, shin pain.  Additional Information: s/p C7-T1 decompressive laminectomy for treatment of cancer

## 2024-09-19 NOTE — DATA REVIEWED
[MRI] : MRI [Right] : of the right [Knee] : knee [Report was reviewed and noted in the chart] : The report was reviewed and noted in the chart [I independently reviewed and interpreted images and report] : I independently reviewed and interpreted images and report [I reviewed the films/CD and additionally noted] : I reviewed the films/CD and additionally noted [FreeTextEntry1] : MRI right knee reveals evidence of medial meniscal flap tear, degeneration of posterior horn of lateral meniscus, mild patellofemoral degenerative changes, tibial plateau degenerative cystic change

## 2024-09-19 NOTE — IMAGING
[de-identified] : The patient is a well appearing 47 year old M of their stated age.  Patient ambulates with a normal gait.  Negative straight leg raise bilateral   Effected Knee: RIGHT  ROM:  0-140 degrees  Lachman: Negative  Pivot Shift: Negative Anterior Drawer: Negative Posterior Drawer / Sag:Negative  Varus Stress 0 degrees: Stable  Varus Stress 30 degrees: Stable  Valgus Stress 0 degrees: Stable Valgus Stress 30 degrees: Stable  Medial Manuelito: POSITIVE  Lateral Gibson: Negative  Patella Glide: 2+  Patella Apprehension: Negative  Patella Grind: Negative    Palpation:  Medial Joint Line: TENDER  Lateral Joint Line: Nontender  Medial Collateral Ligament: Nontender  Lateral Collateral Ligament/PLC: Nontender  Distal Femur: Nontender  Proximal Tibia: Nontender  Tibial Tubercle: Nontender  Distal Pole Patella: Nontender  Quadriceps Tendon: Nontender &  Intact  Patella Tendon: Nontender &  Intact  Medial Distal Hamstring/PES: Nontender  Lateral Distal Hamstring: Nontender & Stable  Iliotibial Band: Nontender  Medial Patellofemoral Ligament: Nontender  Adductor: Nontender  Proximal GSC-Plantaris: Nontender  Calf: Supple & Nontender    Inspection:  Deformity: No  Erythema: No  Ecchymosis: No  Abrasions: No  Effusion: No  Prepatella Bursitis: No  Neurologic Exam:  Sensation L4-S1: Grossly Intact  Motor Exam:  Quadriceps: 5 out of 5  Hamstrings: 5 out of 5  EHL: 5 out of 5  FHL: 5 out of 5  TA: 5 out of 5  GS: 5 out of 5  Circulatory/Pulses:  Dorsalis Pedis: 2+  Posterior Tibialis: 2+  Additional Pertinent Findings: None   >>>>>>>>>>>>>>>>>>>>>>>>>>>  Effected Knee: LEFT  ROM:  0-145 degrees  Lachman: Negative  Pivot Shift: Negative  Anterior Drawer: Negative  Posterior Drawer / Sag:Negative  Varus Stress 0 degrees: Stable  Varus Stress 30 degrees: Stable  Valgus Stress 0 degrees: Stable  Valgus Stress 30 degrees: Stable  Medial Manuelito: Negative  Lateral Manuelito: Negative  Patella Glide: 2+  Patella Apprehension: Negative  Patella Grind: Negative    Palpation:  Medial Joint Line: Nontender  Lateral Joint Line: Nontender  Medial Collateral Ligament: Nontender  Lateral Collateral Ligament/PLC: Nontender  Distal Femur: Nontender  Proximal Tibia: Nontender  Tibial Tubercle: Nontender  Distal Pole Patella: Nontender  Quadriceps Tendon: Nontender &  Intact  Patella Tendon: Nontender &  Intact  Medial Distal Hamstring/PES: Nontender  Lateral Distal Hamstring: Nontender & Stable  Iliotibial Band: Nontender  Medial Patellofemoral Ligament: Nontender  Adductor: Nontender  Proximal GSC-Plantaris: Nontender  Calf: Supple & Nontender   Inspection: Deformity: No  Erythema: No  Ecchymosis: No  Abrasions: No  Effusion: No  Prepatella Bursitis: No  Neurologic Exam:  Sensation L-S1: Grossly Intact   Motor Exam:  Quadriceps: 5 out of 5  Hamstrings: 5 out of 5  EHL: 5 out of 5  FHL: 5 out of 5  TA: 5 out of 5  GS: 5 out of 5  Circulatory/Pulses:  Dorsalis Pedis: 2+ Posterior Tibialis: 2+  Additional Pertinent Findings: None     Assessment: The patient is a 47-year-old male with bilateral knee pain R>L and radiographic and physical exam findings consistent with right knee medial meniscal flap tear.  The patient's condition is acute  Documents/Results Reviewed Today: MRI right knee   Tests/Studies Independently Interpreted Today: MRI right knee reveals evidence of medial meniscal flap tear, degeneration of posterior horn of lateral meniscus, mild patellofemoral degenerative changes, tibial plateau degenerative cystic change.  Pertinent findings include: +SLR bilateral. RIGHT KNEE: 0-140, painful ITB, MJLT, + medial Manuelito Confounding medical conditions/concerns: Multiple myeloma   Plan: Discussed operative vs non-operative treatment options including right knee arthroscopic partial medial meniscectomy and any indicated procedures. Recommended the patient proceed with the suggested surgical procedure to improve the functionality of the affected joint. All questions and concerns regarding the surgery were addressed. Went over the recovery timeline and expected outcomes following surgery. The patient continues to be symptomatic and has failed conservative treatment, deciding to move forward with surgical intervention.  Tests Ordered: Pre-op Prescription Medications Ordered: Discussed appropriate use of OTC anti-inflammatories and analgesics (including but not limited to Aleve, Advil, Tylenol, Motrin, Ibuprofen, Voltaren gel, etc.)  Braces/DME Ordered: None  Activity/Work/Sports Status: avoid deep squatting  Additional Instructions: None Follow-Up: 2 weeks post-op  The patient's current medication management of their orthopedic diagnosis was reviewed today: (1) We discussed a comprehensive treatment plan that included possible pharmaceutical management involving the use of prescription strength medications including but not limited to options such as oral Naprosyn 500mg BID, once daily Meloxicam 15 mg, or 500-650 mg Tylenol versus over the counter oral medications and topical prescription NSAID Pennsaid vs over the counter Voltaren gel.  Based on our extensive discussion, the patient declined prescription medication and will use over the counter Advil, Alleve, Voltaren Gel or Tylenol as directed. (2) There is a moderate risk of morbidity with further treatment, especially from use of prescription strength medications and possible side effects of these medications which include upset stomach with oral medications, skin reactions to topical medications and cardiac/renal issues with long term use. (3) I recommended that the patient follow-up with their medical physician to discuss any significant specific potential issues with long term medication use such as interactions with current medications or with exacerbation of underlying medical comorbidities. (4) The benefits and risks associated with use of injectable, oral or topical, prescription and over the counter anti-inflammatory medications were discussed with the patient. The patient voiced understanding of the risks including but not limited to bleeding, stroke, kidney dysfunction, heart disease, and were referred to the black box warning label for further information.   Consent:  Conservative treatment, nontreatment, nonsurgical intervention and surgical intervention treatment options have been reviewed with the patient.  The patient continues to be symptomatic and has failed conservative treatment, and elects to move forward with surgical intervention.  The patient is indicated for right knee arthroscopic partial medial meniscectomy and all indicated procedures. As such the alternatives, benefits and risks, of the above procedure, including but not limited to bleeding, infection, neurovascular injury, loss of limb, loss of life,  DVT, PE, RSD, inability to return to previous level of activity, inability to return to previous level of employment, advancement of or to osteoarthritic changes, joint instability or motion loss, hardware failure or migration, failure to resolve all symptoms, failure to return to sports and need for further procedures, as well as specific risk of need for future joint arthroplasty were discussed with the patient and/or their legal guardian who agreed to move forward with surgical intervention.  They have reviewed and signed the consent form today after expressing understanding of the above documented conversation. The patient or their representative will contact my office as instructed on the preoperative instruction sheet they received today to schedule surgery in a timely manner as discussed. Over 25 minutes were spent on this encounter including time with the patient and over 15 minutes spent on counseling and coordination of care.   ILaura attest that this documentation has been prepared under the direction and in the presence of Provider Dr. Micah Tobin.   The documentation recorded by the scribe accurately reflects the services IDr. Micah, personally performed and the decisions made by me.

## 2024-09-19 NOTE — HISTORY OF PRESENT ILLNESS
[de-identified] : The patient is a 49 year  old right hand dominant male who presents today complaining of bilateral knee pain R>L.   Date of Injury/Onset: 1/1/23 Pain:    At Rest: 0/10  With Activity:  10/10  Mechanism of injury: gradual onset, no RANDOLPH This is not a Work Related Injury being treated under Worker's Compensation. This is not an athletic injury occurring associated with an interscholastic or organized sports team. Quality of symptoms: sharp posterior pain, popping, shin pain  Improves with: rest Worse with: prolonged sitting to standing, squatting Treatment/Imaging/Studies Since Last Visit: MRI R knee ZP  	Reports Available For Review Today: MRI report  Out of work/sport: not working School/Sport/Position/Occupation: fully disabled Changes since last visit: Feeling worse. Here to review R knee MRI results. Reports sharp pain, popping, shin pain.  Additional Information: s/p C7-T1 decompressive laminectomy for treatment of cancer

## 2024-09-19 NOTE — IMAGING
[de-identified] : The patient is a well appearing 47 year old M of their stated age.  Patient ambulates with a normal gait.  Negative straight leg raise bilateral   Effected Knee: RIGHT  ROM:  0-140 degrees  Lachman: Negative  Pivot Shift: Negative Anterior Drawer: Negative Posterior Drawer / Sag:Negative  Varus Stress 0 degrees: Stable  Varus Stress 30 degrees: Stable  Valgus Stress 0 degrees: Stable Valgus Stress 30 degrees: Stable  Medial Manuelito: POSITIVE  Lateral Gibson: Negative  Patella Glide: 2+  Patella Apprehension: Negative  Patella Grind: Negative    Palpation:  Medial Joint Line: TENDER  Lateral Joint Line: Nontender  Medial Collateral Ligament: Nontender  Lateral Collateral Ligament/PLC: Nontender  Distal Femur: Nontender  Proximal Tibia: Nontender  Tibial Tubercle: Nontender  Distal Pole Patella: Nontender  Quadriceps Tendon: Nontender &  Intact  Patella Tendon: Nontender &  Intact  Medial Distal Hamstring/PES: Nontender  Lateral Distal Hamstring: Nontender & Stable  Iliotibial Band: Nontender  Medial Patellofemoral Ligament: Nontender  Adductor: Nontender  Proximal GSC-Plantaris: Nontender  Calf: Supple & Nontender    Inspection:  Deformity: No  Erythema: No  Ecchymosis: No  Abrasions: No  Effusion: No  Prepatella Bursitis: No  Neurologic Exam:  Sensation L4-S1: Grossly Intact  Motor Exam:  Quadriceps: 5 out of 5  Hamstrings: 5 out of 5  EHL: 5 out of 5  FHL: 5 out of 5  TA: 5 out of 5  GS: 5 out of 5  Circulatory/Pulses:  Dorsalis Pedis: 2+  Posterior Tibialis: 2+  Additional Pertinent Findings: None   >>>>>>>>>>>>>>>>>>>>>>>>>>>  Effected Knee: LEFT  ROM:  0-145 degrees  Lachman: Negative  Pivot Shift: Negative  Anterior Drawer: Negative  Posterior Drawer / Sag:Negative  Varus Stress 0 degrees: Stable  Varus Stress 30 degrees: Stable  Valgus Stress 0 degrees: Stable  Valgus Stress 30 degrees: Stable  Medial Manuelito: Negative  Lateral Manuelito: Negative  Patella Glide: 2+  Patella Apprehension: Negative  Patella Grind: Negative    Palpation:  Medial Joint Line: Nontender  Lateral Joint Line: Nontender  Medial Collateral Ligament: Nontender  Lateral Collateral Ligament/PLC: Nontender  Distal Femur: Nontender  Proximal Tibia: Nontender  Tibial Tubercle: Nontender  Distal Pole Patella: Nontender  Quadriceps Tendon: Nontender &  Intact  Patella Tendon: Nontender &  Intact  Medial Distal Hamstring/PES: Nontender  Lateral Distal Hamstring: Nontender & Stable  Iliotibial Band: Nontender  Medial Patellofemoral Ligament: Nontender  Adductor: Nontender  Proximal GSC-Plantaris: Nontender  Calf: Supple & Nontender   Inspection: Deformity: No  Erythema: No  Ecchymosis: No  Abrasions: No  Effusion: No  Prepatella Bursitis: No  Neurologic Exam:  Sensation L-S1: Grossly Intact   Motor Exam:  Quadriceps: 5 out of 5  Hamstrings: 5 out of 5  EHL: 5 out of 5  FHL: 5 out of 5  TA: 5 out of 5  GS: 5 out of 5  Circulatory/Pulses:  Dorsalis Pedis: 2+ Posterior Tibialis: 2+  Additional Pertinent Findings: None     Assessment: The patient is a 47-year-old male with bilateral knee pain R>L and radiographic and physical exam findings consistent with right knee medial meniscal flap tear.  The patient's condition is acute  Documents/Results Reviewed Today: MRI right knee   Tests/Studies Independently Interpreted Today: MRI right knee reveals evidence of medial meniscal flap tear, degeneration of posterior horn of lateral meniscus, mild patellofemoral degenerative changes, tibial plateau degenerative cystic change.  Pertinent findings include: +SLR bilateral. RIGHT KNEE: 0-140, painful ITB, MJLT, + medial Manuelito Confounding medical conditions/concerns: Multiple myeloma   Plan: Discussed operative vs non-operative treatment options including right knee arthroscopic partial medial meniscectomy and any indicated procedures. Recommended the patient proceed with the suggested surgical procedure to improve the functionality of the affected joint. All questions and concerns regarding the surgery were addressed. Went over the recovery timeline and expected outcomes following surgery. The patient continues to be symptomatic and has failed conservative treatment, deciding to move forward with surgical intervention.  Tests Ordered: Pre-op Prescription Medications Ordered: Discussed appropriate use of OTC anti-inflammatories and analgesics (including but not limited to Aleve, Advil, Tylenol, Motrin, Ibuprofen, Voltaren gel, etc.)  Braces/DME Ordered: None  Activity/Work/Sports Status: avoid deep squatting  Additional Instructions: None Follow-Up: 2 weeks post-op  The patient's current medication management of their orthopedic diagnosis was reviewed today: (1) We discussed a comprehensive treatment plan that included possible pharmaceutical management involving the use of prescription strength medications including but not limited to options such as oral Naprosyn 500mg BID, once daily Meloxicam 15 mg, or 500-650 mg Tylenol versus over the counter oral medications and topical prescription NSAID Pennsaid vs over the counter Voltaren gel.  Based on our extensive discussion, the patient declined prescription medication and will use over the counter Advil, Alleve, Voltaren Gel or Tylenol as directed. (2) There is a moderate risk of morbidity with further treatment, especially from use of prescription strength medications and possible side effects of these medications which include upset stomach with oral medications, skin reactions to topical medications and cardiac/renal issues with long term use. (3) I recommended that the patient follow-up with their medical physician to discuss any significant specific potential issues with long term medication use such as interactions with current medications or with exacerbation of underlying medical comorbidities. (4) The benefits and risks associated with use of injectable, oral or topical, prescription and over the counter anti-inflammatory medications were discussed with the patient. The patient voiced understanding of the risks including but not limited to bleeding, stroke, kidney dysfunction, heart disease, and were referred to the black box warning label for further information.   Consent:  Conservative treatment, nontreatment, nonsurgical intervention and surgical intervention treatment options have been reviewed with the patient.  The patient continues to be symptomatic and has failed conservative treatment, and elects to move forward with surgical intervention.  The patient is indicated for right knee arthroscopic partial medial meniscectomy and all indicated procedures. As such the alternatives, benefits and risks, of the above procedure, including but not limited to bleeding, infection, neurovascular injury, loss of limb, loss of life,  DVT, PE, RSD, inability to return to previous level of activity, inability to return to previous level of employment, advancement of or to osteoarthritic changes, joint instability or motion loss, hardware failure or migration, failure to resolve all symptoms, failure to return to sports and need for further procedures, as well as specific risk of need for future joint arthroplasty were discussed with the patient and/or their legal guardian who agreed to move forward with surgical intervention.  They have reviewed and signed the consent form today after expressing understanding of the above documented conversation. The patient or their representative will contact my office as instructed on the preoperative instruction sheet they received today to schedule surgery in a timely manner as discussed. Over 25 minutes were spent on this encounter including time with the patient and over 15 minutes spent on counseling and coordination of care.   ILaura attest that this documentation has been prepared under the direction and in the presence of Provider Dr. Micah Tobin.   The documentation recorded by the scribe accurately reflects the services IDr. Micah, personally performed and the decisions made by me.

## 2024-09-20 DIAGNOSIS — S83.231A COMPLEX TEAR OF MEDIAL MENISCUS, CURRENT INJURY, RIGHT KNEE, INITIAL ENCOUNTER: ICD-10-CM

## 2024-09-20 RX ORDER — HYDROCODONE BITARTRATE AND ACETAMINOPHEN 5; 325 MG/1; MG/1
5-325 TABLET ORAL
Qty: 30 | Refills: 0 | Status: ACTIVE | COMMUNITY
Start: 2024-09-20 | End: 1900-01-01

## 2024-09-20 RX ORDER — ONDANSETRON 4 MG/1
4 TABLET ORAL
Qty: 15 | Refills: 0 | Status: ACTIVE | COMMUNITY
Start: 2024-09-20 | End: 1900-01-01

## 2024-09-20 RX ORDER — DOCUSATE SODIUM 100 MG/1
100 CAPSULE ORAL 3 TIMES DAILY
Qty: 21 | Refills: 0 | Status: ACTIVE | COMMUNITY
Start: 2024-09-20 | End: 1900-01-01

## 2024-09-24 ENCOUNTER — NON-APPOINTMENT (OUTPATIENT)
Age: 49
End: 2024-09-24

## 2024-09-25 ENCOUNTER — APPOINTMENT (OUTPATIENT)
Dept: ORTHOPEDIC SURGERY | Facility: AMBULATORY SURGERY CENTER | Age: 49
End: 2024-09-25
Payer: COMMERCIAL

## 2024-09-25 PROCEDURE — 29880 ARTHRS KNE SRG MNISECTMY M&L: CPT | Mod: RT

## 2024-09-25 PROCEDURE — 29880 ARTHRS KNE SRG MNISECTMY M&L: CPT | Mod: AS,RT

## 2024-09-30 RX ORDER — HYDROCODONE BITARTRATE AND ACETAMINOPHEN 5; 325 MG/1; MG/1
5-325 TABLET ORAL
Qty: 15 | Refills: 0 | Status: ACTIVE | COMMUNITY
Start: 2024-09-30 | End: 1900-01-01

## 2024-10-04 ENCOUNTER — APPOINTMENT (OUTPATIENT)
Dept: ORTHOPEDIC SURGERY | Facility: CLINIC | Age: 49
End: 2024-10-04
Payer: COMMERCIAL

## 2024-10-04 VITALS — BODY MASS INDEX: 37.03 KG/M2 | HEIGHT: 69 IN | WEIGHT: 250 LBS

## 2024-10-04 DIAGNOSIS — M54.16 RADICULOPATHY, LUMBAR REGION: ICD-10-CM

## 2024-10-04 DIAGNOSIS — S83.231A COMPLEX TEAR OF MEDIAL MENISCUS, CURRENT INJURY, RIGHT KNEE, INITIAL ENCOUNTER: ICD-10-CM

## 2024-10-04 PROCEDURE — 99024 POSTOP FOLLOW-UP VISIT: CPT

## 2024-10-04 RX ORDER — CELECOXIB 200 MG/1
200 CAPSULE ORAL TWICE DAILY
Qty: 60 | Refills: 0 | Status: ACTIVE | COMMUNITY
Start: 2024-10-04 | End: 1900-01-01

## 2024-10-04 NOTE — PHYSICAL EXAM
[de-identified] : The patient is a well appearing 49 year old male of their stated age. Patient ambulates with an antalgic gait with the use of a walker  Surgical site: Right Knee    Incision sites: Well approximated, clean, dry, intact, without drainage, without erythema  Range of motion: 3-90 degrees    Motor Testin-/5 quadriceps strength    Stability Testing: Limited by pain   Swelling/Effusion: Moderate effusion    Tenderness to palpation: +TTP quadriceps    Provocative testing: Limited by pain   Right Calf: soft and nontender   Left Calf: soft and nontender   Neurovascular Examination: Grossly intact, 2+ distal pulses   Contralateral Extremity: Examination grossly benign   Assessment & Plan: The patient is approximately 2 weeks s/p right knee EUA arthroscopic PMM PLM, chondroplasty (DOS: 24). Sutures removed and Steri Strips applied today. The patient is instructed in wound management. The patient's post-op plan, protocol and activity modifications have been thoroughly discussed and the patient expressed understanding. Patient will continue use of walker as needed and begin to move away from it as tolerated. Obtain Reparel knee sleeve and utilize it as much as possible. Continue physical therapy to work on quadriceps strength. The patient will control pain as discussed & continue ice and elevation as needed. The patient otherwise may advance activity as discussed. Follow up 4 weeks.  In addition, the patient is currently having radicular pain down his right leg. We discussed taking Celebrex 200mg PRN for discomfort, particularly at night. If these symptoms persist despite physical therapy and medication management, we discussed following up with his spine specialist.    The patient's current medication management of their orthopedic diagnosis was reviewed today:  (1) We discussed a comprehensive treatment plan that included possible pharmaceutical management involving the use of prescription strength medications including but not limited to options such as oral Naprosyn 500mg BID, once daily Meloxicam 15 mg, or 500-650 mg Tylenol versus over the counter oral medications and topical prescription NSAID Pennsaid vs over the counter Voltaren gel. Based on our extensive discussion, the patient declined prescription medication and will use over the counter Advil, Alleve, Voltaren Gel or Tylenol as directed.  (2) There is a moderate risk of morbidity with further treatment, especially from use of prescription strength medications and possible side effects of these medications which include upset stomach with oral medications, skin reactions to topical medications and cardiac/renal issues with long term use.  (3) I recommended that the patient follow-up with their medical physician to discuss any significant specific potential issues with long term medication use such as interactions with current medications or with exacerbation of underlying medical comorbidities.  (4) The benefits and risks associated with use of injectable, oral or topical, prescription and over the counter anti-inflammatory medications were discussed with the patient. The patient voiced understanding of the risks including but not limited to bleeding, stroke, kidney dysfunction, heart disease, and were referred to the black box warning label for further information.  The documentation accurately reflects the service IMartha PA-C, personally performed and the decisions made by me.

## 2024-10-04 NOTE — HISTORY OF PRESENT ILLNESS
[de-identified] : The patient is s/p right knee EUA arthroscopic PMM PLM, chondroplasty patella and trochlea  Date of Surgery: 9/25/24 Pain:    At Rest: 5/10              With Activity:  6-7/10   Mechanism of injury: gradual onset, no RANDOLPH This is NOT a Work Related Injury being treated under Worker's Compensation.  This is NOT an athletic injury occurring associated with an interscholastic or organized sports team.  Treatment/Imaging/Studies Since Last Visit: surgery, PT 3x/week  Reports Available For Review Today: op report, op pics  Out of work/sport: not working  School/Sport/Position/Occupation: Fully disabled  Change since last visit: Surgery. States he had a fever, chills, and nausea after surgery. Also had a cold after surgery. States his temperature went up to 100 degrees. In PT 3x/week. Using walker to ambulate because he has right hip pain and sciatica pain that travels to mid hamstring. Pain with sleeping, prolonged sitting.  Additional Information: None

## 2024-10-31 ENCOUNTER — APPOINTMENT (OUTPATIENT)
Dept: ORTHOPEDIC SURGERY | Facility: CLINIC | Age: 49
End: 2024-10-31
Payer: COMMERCIAL

## 2024-10-31 VITALS — WEIGHT: 250 LBS | BODY MASS INDEX: 37.03 KG/M2 | HEIGHT: 69 IN

## 2024-10-31 DIAGNOSIS — S83.231A COMPLEX TEAR OF MEDIAL MENISCUS, CURRENT INJURY, RIGHT KNEE, INITIAL ENCOUNTER: ICD-10-CM

## 2024-10-31 DIAGNOSIS — M25.561 PAIN IN RIGHT KNEE: ICD-10-CM

## 2024-10-31 PROCEDURE — 99024 POSTOP FOLLOW-UP VISIT: CPT

## 2024-12-19 ENCOUNTER — APPOINTMENT (OUTPATIENT)
Dept: ORTHOPEDIC SURGERY | Facility: CLINIC | Age: 49
End: 2024-12-19
Payer: COMMERCIAL

## 2024-12-19 VITALS — WEIGHT: 250 LBS | HEIGHT: 69 IN | BODY MASS INDEX: 37.03 KG/M2

## 2024-12-19 DIAGNOSIS — S83.231A COMPLEX TEAR OF MEDIAL MENISCUS, CURRENT INJURY, RIGHT KNEE, INITIAL ENCOUNTER: ICD-10-CM

## 2024-12-19 DIAGNOSIS — M25.561 PAIN IN RIGHT KNEE: ICD-10-CM

## 2024-12-19 PROCEDURE — 99024 POSTOP FOLLOW-UP VISIT: CPT

## 2025-01-30 ENCOUNTER — APPOINTMENT (OUTPATIENT)
Dept: ORTHOPEDIC SURGERY | Facility: CLINIC | Age: 50
End: 2025-01-30
Payer: COMMERCIAL

## 2025-01-30 VITALS — BODY MASS INDEX: 37.03 KG/M2 | WEIGHT: 250 LBS | HEIGHT: 69 IN

## 2025-01-30 DIAGNOSIS — S83.231A COMPLEX TEAR OF MEDIAL MENISCUS, CURRENT INJURY, RIGHT KNEE, INITIAL ENCOUNTER: ICD-10-CM

## 2025-01-30 DIAGNOSIS — M25.561 PAIN IN RIGHT KNEE: ICD-10-CM

## 2025-01-30 DIAGNOSIS — M22.41 CHONDROMALACIA PATELLAE, RIGHT KNEE: ICD-10-CM

## 2025-01-30 PROCEDURE — 99213 OFFICE O/P EST LOW 20 MIN: CPT

## 2025-03-20 ENCOUNTER — APPOINTMENT (OUTPATIENT)
Dept: ORTHOPEDIC SURGERY | Facility: CLINIC | Age: 50
End: 2025-03-20
Payer: COMMERCIAL

## 2025-03-20 VITALS — HEIGHT: 69 IN | BODY MASS INDEX: 37.03 KG/M2 | WEIGHT: 250 LBS

## 2025-03-20 DIAGNOSIS — M22.41 CHONDROMALACIA PATELLAE, RIGHT KNEE: ICD-10-CM

## 2025-03-20 DIAGNOSIS — S83.231A COMPLEX TEAR OF MEDIAL MENISCUS, CURRENT INJURY, RIGHT KNEE, INITIAL ENCOUNTER: ICD-10-CM

## 2025-03-20 PROCEDURE — 99213 OFFICE O/P EST LOW 20 MIN: CPT

## 2025-04-01 RX ORDER — HYALURONATE SODIUM, STABILIZED 88 MG/4 ML
88 SYRINGE (ML) INTRAARTICULAR
Qty: 1 | Refills: 0 | Status: ACTIVE | COMMUNITY
Start: 2025-04-01 | End: 1900-01-01

## 2025-04-24 VITALS — WEIGHT: 250 LBS | BODY MASS INDEX: 37.03 KG/M2 | HEIGHT: 69 IN

## 2025-04-25 ENCOUNTER — APPOINTMENT (OUTPATIENT)
Dept: ORTHOPEDIC SURGERY | Facility: CLINIC | Age: 50
End: 2025-04-25
Payer: COMMERCIAL

## 2025-04-25 DIAGNOSIS — S83.231A COMPLEX TEAR OF MEDIAL MENISCUS, CURRENT INJURY, RIGHT KNEE, INITIAL ENCOUNTER: ICD-10-CM

## 2025-04-25 DIAGNOSIS — M22.41 CHONDROMALACIA PATELLAE, RIGHT KNEE: ICD-10-CM

## 2025-04-25 PROCEDURE — 20611 DRAIN/INJ JOINT/BURSA W/US: CPT | Mod: RT

## 2025-04-25 PROCEDURE — 99212 OFFICE O/P EST SF 10 MIN: CPT | Mod: 25

## 2025-04-28 ENCOUNTER — APPOINTMENT (OUTPATIENT)
Dept: ORTHOPEDIC SURGERY | Facility: CLINIC | Age: 50
End: 2025-04-28
Payer: COMMERCIAL

## 2025-04-28 VITALS — BODY MASS INDEX: 37.03 KG/M2 | WEIGHT: 250 LBS | HEIGHT: 69 IN

## 2025-04-28 DIAGNOSIS — S12.9XXA FRACTURE OF NECK, UNSPECIFIED, INITIAL ENCOUNTER: ICD-10-CM

## 2025-04-28 DIAGNOSIS — C90.00 MULTIPLE MYELOMA NOT HAVING ACHIEVED REMISSION: ICD-10-CM

## 2025-04-28 PROCEDURE — 99214 OFFICE O/P EST MOD 30 MIN: CPT

## 2025-04-29 PROBLEM — S12.9XXA: Status: ACTIVE | Noted: 2025-04-29

## 2025-05-08 ENCOUNTER — APPOINTMENT (OUTPATIENT)
Dept: ORTHOPEDIC SURGERY | Facility: CLINIC | Age: 50
End: 2025-05-08
Payer: COMMERCIAL

## 2025-05-08 VITALS — BODY MASS INDEX: 37.03 KG/M2 | WEIGHT: 250 LBS | HEIGHT: 69 IN

## 2025-05-08 DIAGNOSIS — M25.511 PAIN IN RIGHT SHOULDER: ICD-10-CM

## 2025-05-08 DIAGNOSIS — M75.81 OTHER SHOULDER LESIONS, RIGHT SHOULDER: ICD-10-CM

## 2025-05-08 DIAGNOSIS — M75.51 BURSITIS OF RIGHT SHOULDER: ICD-10-CM

## 2025-05-08 PROCEDURE — 73010 X-RAY EXAM OF SHOULDER BLADE: CPT | Mod: RT

## 2025-05-08 PROCEDURE — 73030 X-RAY EXAM OF SHOULDER: CPT | Mod: RT

## 2025-05-08 PROCEDURE — 20610 DRAIN/INJ JOINT/BURSA W/O US: CPT | Mod: RT

## 2025-05-08 PROCEDURE — 72040 X-RAY EXAM NECK SPINE 2-3 VW: CPT

## 2025-05-08 PROCEDURE — J3490M: CUSTOM

## 2025-05-08 PROCEDURE — 99214 OFFICE O/P EST MOD 30 MIN: CPT | Mod: 25

## 2025-05-09 PROBLEM — M75.51 BURSITIS OF RIGHT SHOULDER: Status: ACTIVE | Noted: 2025-05-08

## 2025-05-09 PROBLEM — M75.81 TENDINITIS OF RIGHT ROTATOR CUFF: Status: ACTIVE | Noted: 2025-05-08

## 2025-06-19 ENCOUNTER — APPOINTMENT (OUTPATIENT)
Dept: ORTHOPEDIC SURGERY | Facility: CLINIC | Age: 50
End: 2025-06-19

## 2025-06-26 ENCOUNTER — APPOINTMENT (OUTPATIENT)
Dept: ORTHOPEDIC SURGERY | Facility: HOSPITAL | Age: 50
End: 2025-06-26

## 2025-06-26 PROCEDURE — 22853 INSJ BIOMECHANICAL DEVICE: CPT | Mod: AS,58,59

## 2025-06-26 PROCEDURE — 22614 ARTHRD PST TQ 1NTRSPC EA ADD: CPT

## 2025-06-26 PROCEDURE — 22853 INSJ BIOMECHANICAL DEVICE: CPT | Mod: 58

## 2025-06-26 PROCEDURE — 22600 ARTHRD PST TQ 1NTRSPC CRV: CPT | Mod: AS

## 2025-06-26 PROCEDURE — 22830 EXPLORATION OF SPINAL FUSION: CPT

## 2025-06-26 PROCEDURE — 22554 ARTHRD ANT NTRBD MIN DSC CRV: CPT | Mod: AS,58

## 2025-06-26 PROCEDURE — 22600 ARTHRD PST TQ 1NTRSPC CRV: CPT

## 2025-06-26 PROCEDURE — 22614 ARTHRD PST TQ 1NTRSPC EA ADD: CPT | Mod: AS

## 2025-06-26 PROCEDURE — 22585 ARTHRD ANT NTRBD MIN DSC EA: CPT | Mod: 58

## 2025-06-26 PROCEDURE — 22849 REINSERT SPINAL FIXATION: CPT | Mod: AS

## 2025-06-26 PROCEDURE — 22845 INSERT SPINE FIXATION DEVICE: CPT | Mod: 58

## 2025-06-26 PROCEDURE — 22554 ARTHRD ANT NTRBD MIN DSC CRV: CPT | Mod: 58

## 2025-06-26 PROCEDURE — 22830 EXPLORATION OF SPINAL FUSION: CPT | Mod: AS,59

## 2025-06-26 PROCEDURE — 22849 REINSERT SPINAL FIXATION: CPT

## 2025-06-26 PROCEDURE — 22585 ARTHRD ANT NTRBD MIN DSC EA: CPT | Mod: AS,58

## 2025-06-26 PROCEDURE — 22845 INSERT SPINE FIXATION DEVICE: CPT | Mod: AS,58

## 2025-07-07 RX ORDER — OXYCODONE 5 MG/1
5 TABLET ORAL EVERY 6 HOURS
Qty: 48 | Refills: 0 | Status: ACTIVE | COMMUNITY
Start: 2025-07-03 | End: 1900-01-01

## 2025-07-09 ENCOUNTER — APPOINTMENT (OUTPATIENT)
Dept: ORTHOPEDIC SURGERY | Facility: CLINIC | Age: 50
End: 2025-07-09
Payer: COMMERCIAL

## 2025-07-09 VITALS — BODY MASS INDEX: 37.03 KG/M2 | WEIGHT: 250 LBS | HEIGHT: 69 IN

## 2025-07-09 PROCEDURE — 99024 POSTOP FOLLOW-UP VISIT: CPT

## 2025-07-09 PROCEDURE — 72040 X-RAY EXAM NECK SPINE 2-3 VW: CPT

## 2025-07-09 RX ORDER — METHOCARBAMOL 750 MG/1
750 TABLET, FILM COATED ORAL 3 TIMES DAILY
Qty: 90 | Refills: 1 | Status: ACTIVE | COMMUNITY
Start: 2025-07-09 | End: 1900-01-01

## 2025-07-21 DIAGNOSIS — Z98.1 ARTHRODESIS STATUS: ICD-10-CM

## 2025-07-21 DIAGNOSIS — M47.812 SPONDYLOSIS W/OUT MYELOPATHY OR RADICULOPATHY, CERVICAL REGION: ICD-10-CM

## 2025-08-04 ENCOUNTER — APPOINTMENT (OUTPATIENT)
Dept: ORTHOPEDIC SURGERY | Facility: CLINIC | Age: 50
End: 2025-08-04
Payer: COMMERCIAL

## 2025-08-04 VITALS — WEIGHT: 250 LBS | HEIGHT: 69 IN | BODY MASS INDEX: 37.03 KG/M2

## 2025-08-04 DIAGNOSIS — S12.9XXA FRACTURE OF NECK, UNSPECIFIED, INITIAL ENCOUNTER: ICD-10-CM

## 2025-08-04 PROCEDURE — 99024 POSTOP FOLLOW-UP VISIT: CPT

## 2025-08-04 RX ORDER — METHOCARBAMOL 750 MG/1
750 TABLET, FILM COATED ORAL 4 TIMES DAILY
Qty: 120 | Refills: 1 | Status: ACTIVE | COMMUNITY
Start: 2025-08-04 | End: 1900-01-01

## 2025-08-04 RX ORDER — OXYCODONE 5 MG/1
5 TABLET ORAL 4 TIMES DAILY
Qty: 120 | Refills: 0 | Status: ACTIVE | COMMUNITY
Start: 2025-08-04 | End: 1900-01-01

## 2025-08-20 ENCOUNTER — APPOINTMENT (OUTPATIENT)
Dept: ORTHOPEDIC SURGERY | Facility: CLINIC | Age: 50
End: 2025-08-20

## 2025-08-28 ENCOUNTER — APPOINTMENT (OUTPATIENT)
Dept: ORTHOPEDIC SURGERY | Facility: CLINIC | Age: 50
End: 2025-08-28
Payer: COMMERCIAL

## 2025-08-28 VITALS — BODY MASS INDEX: 37.03 KG/M2 | HEIGHT: 69 IN | WEIGHT: 250 LBS

## 2025-08-28 DIAGNOSIS — M22.41 CHONDROMALACIA PATELLAE, RIGHT KNEE: ICD-10-CM

## 2025-08-28 DIAGNOSIS — S83.231A COMPLEX TEAR OF MEDIAL MENISCUS, CURRENT INJURY, RIGHT KNEE, INITIAL ENCOUNTER: ICD-10-CM

## 2025-08-28 PROCEDURE — 99213 OFFICE O/P EST LOW 20 MIN: CPT

## 2025-08-29 ENCOUNTER — APPOINTMENT (OUTPATIENT)
Dept: ORTHOPEDIC SURGERY | Facility: CLINIC | Age: 50
End: 2025-08-29

## 2025-08-29 VITALS — HEIGHT: 69 IN | BODY MASS INDEX: 36.14 KG/M2 | WEIGHT: 244 LBS

## 2025-08-29 DIAGNOSIS — M54.16 RADICULOPATHY, LUMBAR REGION: ICD-10-CM

## 2025-08-29 DIAGNOSIS — M79.10 MYALGIA, UNSPECIFIED SITE: ICD-10-CM

## 2025-08-29 DIAGNOSIS — S12.9XXA FRACTURE OF NECK, UNSPECIFIED, INITIAL ENCOUNTER: ICD-10-CM

## 2025-09-17 ENCOUNTER — APPOINTMENT (OUTPATIENT)
Dept: ORTHOPEDIC SURGERY | Facility: CLINIC | Age: 50
End: 2025-09-17